# Patient Record
Sex: MALE | Race: WHITE | NOT HISPANIC OR LATINO | Employment: FULL TIME | URBAN - METROPOLITAN AREA
[De-identification: names, ages, dates, MRNs, and addresses within clinical notes are randomized per-mention and may not be internally consistent; named-entity substitution may affect disease eponyms.]

---

## 2021-10-13 ENCOUNTER — OFFICE VISIT (OUTPATIENT)
Dept: OBGYN CLINIC | Facility: MEDICAL CENTER | Age: 51
End: 2021-10-13
Payer: OTHER MISCELLANEOUS

## 2021-10-13 VITALS
BODY MASS INDEX: 33.03 KG/M2 | HEART RATE: 76 BPM | WEIGHT: 223 LBS | SYSTOLIC BLOOD PRESSURE: 150 MMHG | HEIGHT: 69 IN | DIASTOLIC BLOOD PRESSURE: 101 MMHG

## 2021-10-13 DIAGNOSIS — S86.012A STRAIN OF ACHILLES TENDON, LEFT, INITIAL ENCOUNTER: Primary | ICD-10-CM

## 2021-10-13 DIAGNOSIS — S86.112A GASTROCNEMIUS STRAIN, LEFT, INITIAL ENCOUNTER: ICD-10-CM

## 2021-10-13 PROCEDURE — 99204 OFFICE O/P NEW MOD 45 MIN: CPT | Performed by: EMERGENCY MEDICINE

## 2021-10-13 RX ORDER — LORATADINE 10 MG/1
10 TABLET ORAL DAILY
COMMUNITY

## 2021-10-13 RX ORDER — CHLORHEXIDINE GLUCONATE 0.12 MG/ML
RINSE ORAL
COMMUNITY
Start: 2021-09-15

## 2021-10-13 RX ORDER — TERBINAFINE HYDROCHLORIDE 250 MG/1
250 TABLET ORAL DAILY
COMMUNITY
Start: 2021-10-01

## 2021-10-13 RX ORDER — NAPROXEN 500 MG/1
TABLET ORAL AS NEEDED
COMMUNITY
Start: 2021-08-25

## 2021-10-13 RX ORDER — AMLODIPINE BESYLATE 5 MG/1
5 TABLET ORAL
COMMUNITY
Start: 2021-04-19

## 2021-10-13 RX ORDER — ASPIRIN 81 MG/1
TABLET ORAL
COMMUNITY
Start: 2021-08-30

## 2021-10-13 RX ORDER — LEVOTHYROXINE SODIUM 0.15 MG/1
150 TABLET ORAL
COMMUNITY
Start: 2021-09-21 | End: 2022-01-06 | Stop reason: DRUGHIGH

## 2021-10-13 RX ORDER — ATORVASTATIN CALCIUM 40 MG/1
TABLET, FILM COATED ORAL
COMMUNITY
Start: 2021-04-19

## 2021-10-13 RX ORDER — HYDROCHLOROTHIAZIDE 25 MG/1
25 TABLET ORAL DAILY
COMMUNITY
Start: 2021-04-19

## 2021-10-13 RX ORDER — IBUPROFEN 600 MG/1
TABLET ORAL
COMMUNITY
Start: 2021-09-15

## 2021-10-13 RX ORDER — FLUTICASONE PROPIONATE 50 MCG
1-2 SPRAY, SUSPENSION (ML) NASAL AS NEEDED
COMMUNITY
Start: 2021-09-21

## 2021-10-13 RX ORDER — PROPRANOLOL HYDROCHLORIDE 20 MG/1
TABLET ORAL
COMMUNITY
Start: 2021-07-01

## 2021-10-13 RX ORDER — OMEPRAZOLE 20 MG/1
CAPSULE, DELAYED RELEASE ORAL
COMMUNITY
Start: 2021-09-19

## 2021-10-13 RX ORDER — EPINEPHRINE 0.3 MG/.3ML
0.3 INJECTION SUBCUTANEOUS
COMMUNITY

## 2021-10-26 ENCOUNTER — HOSPITAL ENCOUNTER (OUTPATIENT)
Dept: MRI IMAGING | Facility: HOSPITAL | Age: 51
Discharge: HOME | End: 2021-10-26
Payer: OTHER MISCELLANEOUS

## 2021-10-26 DIAGNOSIS — S86.012A STRAIN OF ACHILLES TENDON, LEFT, INITIAL ENCOUNTER: ICD-10-CM

## 2021-10-26 PROCEDURE — G1004 CDSM NDSC: HCPCS

## 2021-10-26 PROCEDURE — 73721 MRI JNT OF LWR EXTRE W/O DYE: CPT

## 2021-11-05 ENCOUNTER — OFFICE VISIT (OUTPATIENT)
Dept: OBGYN CLINIC | Facility: MEDICAL CENTER | Age: 51
End: 2021-11-05
Payer: OTHER MISCELLANEOUS

## 2021-11-05 VITALS
BODY MASS INDEX: 32.88 KG/M2 | WEIGHT: 222 LBS | HEART RATE: 70 BPM | DIASTOLIC BLOOD PRESSURE: 78 MMHG | HEIGHT: 69 IN | TEMPERATURE: 98.3 F | SYSTOLIC BLOOD PRESSURE: 180 MMHG

## 2021-11-05 DIAGNOSIS — S86.012D ACHILLES TENDON TEAR, LEFT, SUBSEQUENT ENCOUNTER: Primary | ICD-10-CM

## 2021-11-05 DIAGNOSIS — M67.88 ACHILLES TENDONOSIS OF LEFT LOWER EXTREMITY: ICD-10-CM

## 2021-11-05 PROCEDURE — 99213 OFFICE O/P EST LOW 20 MIN: CPT | Performed by: EMERGENCY MEDICINE

## 2021-11-08 ENCOUNTER — TELEPHONE (OUTPATIENT)
Dept: PODIATRY | Facility: CLINIC | Age: 51
End: 2021-11-08

## 2021-11-10 ENCOUNTER — OFFICE VISIT (OUTPATIENT)
Dept: PODIATRY | Facility: CLINIC | Age: 51
End: 2021-11-10
Payer: OTHER MISCELLANEOUS

## 2021-11-10 VITALS
BODY MASS INDEX: 32.88 KG/M2 | HEIGHT: 69 IN | HEART RATE: 57 BPM | DIASTOLIC BLOOD PRESSURE: 92 MMHG | SYSTOLIC BLOOD PRESSURE: 148 MMHG | WEIGHT: 222 LBS

## 2021-11-10 DIAGNOSIS — M25.775 OSTEOPHYTE, LEFT FOOT: ICD-10-CM

## 2021-11-10 DIAGNOSIS — S86.012A RUPTURE OF LEFT ACHILLES TENDON, INITIAL ENCOUNTER: Primary | ICD-10-CM

## 2021-11-10 PROCEDURE — 99243 OFF/OP CNSLTJ NEW/EST LOW 30: CPT | Performed by: PODIATRIST

## 2021-11-10 RX ORDER — METFORMIN HYDROCHLORIDE 500 MG/1
500 TABLET, EXTENDED RELEASE ORAL
COMMUNITY
Start: 2021-11-10 | End: 2022-11-10

## 2021-11-10 RX ORDER — LEVALBUTEROL TARTRATE 45 UG/1
AEROSOL, METERED ORAL AS NEEDED
COMMUNITY

## 2021-11-16 ENCOUNTER — PREP FOR PROCEDURE (OUTPATIENT)
Dept: PODIATRY | Facility: CLINIC | Age: 51
End: 2021-11-16

## 2021-11-16 ENCOUNTER — TELEPHONE (OUTPATIENT)
Dept: PODIATRY | Facility: CLINIC | Age: 51
End: 2021-11-16

## 2021-11-16 DIAGNOSIS — Z01.818 PRE-OP TESTING: ICD-10-CM

## 2021-11-16 DIAGNOSIS — S86.012A RUPTURE OF LEFT ACHILLES TENDON, INITIAL ENCOUNTER: Primary | ICD-10-CM

## 2021-11-16 DIAGNOSIS — M25.775 OSTEOPHYTE, LEFT FOOT: ICD-10-CM

## 2021-11-16 RX ORDER — CHLORHEXIDINE GLUCONATE 4 G/100ML
SOLUTION TOPICAL DAILY PRN
Status: CANCELLED | OUTPATIENT
Start: 2021-11-23

## 2021-11-16 RX ORDER — CHLORHEXIDINE GLUCONATE 0.12 MG/ML
15 RINSE ORAL ONCE
Status: CANCELLED | OUTPATIENT
Start: 2021-11-23

## 2021-11-16 RX ORDER — CEFAZOLIN SODIUM 2 G/50ML
2000 SOLUTION INTRAVENOUS ONCE
Status: CANCELLED | OUTPATIENT
Start: 2021-11-23

## 2021-11-22 ENCOUNTER — ANESTHESIA EVENT (OUTPATIENT)
Dept: PERIOP | Facility: HOSPITAL | Age: 51
End: 2021-11-22
Payer: OTHER MISCELLANEOUS

## 2021-11-22 RX ORDER — ACETAMINOPHEN 500 MG
500 TABLET ORAL EVERY 6 HOURS PRN
COMMUNITY

## 2021-11-23 ENCOUNTER — APPOINTMENT (OUTPATIENT)
Dept: RADIOLOGY | Facility: HOSPITAL | Age: 51
End: 2021-11-23
Payer: OTHER MISCELLANEOUS

## 2021-11-23 ENCOUNTER — HOSPITAL ENCOUNTER (OUTPATIENT)
Facility: HOSPITAL | Age: 51
Setting detail: OUTPATIENT SURGERY
Discharge: HOME | End: 2021-11-23
Attending: PODIATRIST | Admitting: PODIATRIST
Payer: OTHER MISCELLANEOUS

## 2021-11-23 ENCOUNTER — HOSPITAL ENCOUNTER (OUTPATIENT)
Dept: RADIOLOGY | Facility: HOSPITAL | Age: 51
Discharge: HOME | End: 2021-11-23
Payer: OTHER MISCELLANEOUS

## 2021-11-23 ENCOUNTER — ANESTHESIA (OUTPATIENT)
Dept: PERIOP | Facility: HOSPITAL | Age: 51
End: 2021-11-23
Payer: OTHER MISCELLANEOUS

## 2021-11-23 VITALS
TEMPERATURE: 97.5 F | HEIGHT: 69 IN | DIASTOLIC BLOOD PRESSURE: 59 MMHG | HEART RATE: 65 BPM | RESPIRATION RATE: 14 BRPM | OXYGEN SATURATION: 96 % | WEIGHT: 251.77 LBS | SYSTOLIC BLOOD PRESSURE: 107 MMHG | BODY MASS INDEX: 37.29 KG/M2

## 2021-11-23 DIAGNOSIS — S86.012A RUPTURE OF LEFT ACHILLES TENDON, INITIAL ENCOUNTER: ICD-10-CM

## 2021-11-23 DIAGNOSIS — Z98.890 POST-OPERATIVE STATE: Primary | ICD-10-CM

## 2021-11-23 DIAGNOSIS — M25.775 OSTEOPHYTE, LEFT FOOT: ICD-10-CM

## 2021-11-23 LAB — GLUCOSE SERPL-MCNC: 100 MG/DL (ref 65–140)

## 2021-11-23 PROCEDURE — V2790 AMNIOTIC MEMBRANE: HCPCS | Performed by: PODIATRIST

## 2021-11-23 PROCEDURE — 88304 TISSUE EXAM BY PATHOLOGIST: CPT | Performed by: PATHOLOGY

## 2021-11-23 PROCEDURE — C1713 ANCHOR/SCREW BN/BN,TIS/BN: HCPCS | Performed by: PODIATRIST

## 2021-11-23 PROCEDURE — 73610 X-RAY EXAM OF ANKLE: CPT

## 2021-11-23 PROCEDURE — 73650 X-RAY EXAM OF HEEL: CPT

## 2021-11-23 PROCEDURE — 27650 REPAIR ACHILLES TENDON: CPT | Performed by: PODIATRIST

## 2021-11-23 PROCEDURE — 28120 PART REMOVAL OF ANKLE/HEEL: CPT | Performed by: PODIATRIST

## 2021-11-23 PROCEDURE — 82948 REAGENT STRIP/BLOOD GLUCOSE: CPT

## 2021-11-23 PROCEDURE — C1781 MESH (IMPLANTABLE): HCPCS | Performed by: PODIATRIST

## 2021-11-23 PROCEDURE — 76000 FLUOROSCOPY <1 HR PHYS/QHP: CPT | Performed by: PODIATRIST

## 2021-11-23 PROCEDURE — NC001 PR NO CHARGE: Performed by: PODIATRIST

## 2021-11-23 DEVICE — KIT SUTURE SPEEDBRIDGE W/ JUMP  CONVENIENCE ACHILLES: Type: IMPLANTABLE DEVICE | Site: ACHILLES TENDON | Status: FUNCTIONAL

## 2021-11-23 DEVICE — IMPLANTABLE DEVICE: Type: IMPLANTABLE DEVICE | Site: ACHILLES TENDON | Status: FUNCTIONAL

## 2021-11-23 RX ORDER — DEXAMETHASONE SODIUM PHOSPHATE 4 MG/ML
INJECTION, SOLUTION INTRA-ARTICULAR; INTRALESIONAL; INTRAMUSCULAR; INTRAVENOUS; SOFT TISSUE AS NEEDED
Status: DISCONTINUED | OUTPATIENT
Start: 2021-11-23 | End: 2021-11-23

## 2021-11-23 RX ORDER — CEFAZOLIN SODIUM 2 G/50ML
SOLUTION INTRAVENOUS AS NEEDED
Status: DISCONTINUED | OUTPATIENT
Start: 2021-11-23 | End: 2021-11-23

## 2021-11-23 RX ORDER — ONDANSETRON 2 MG/ML
4 INJECTION INTRAMUSCULAR; INTRAVENOUS EVERY 6 HOURS PRN
Status: DISCONTINUED | OUTPATIENT
Start: 2021-11-23 | End: 2021-11-23 | Stop reason: HOSPADM

## 2021-11-23 RX ORDER — LIDOCAINE HYDROCHLORIDE 20 MG/ML
INJECTION, SOLUTION EPIDURAL; INFILTRATION; INTRACAUDAL; PERINEURAL AS NEEDED
Status: DISCONTINUED | OUTPATIENT
Start: 2021-11-23 | End: 2021-11-23

## 2021-11-23 RX ORDER — ROCURONIUM BROMIDE 10 MG/ML
INJECTION, SOLUTION INTRAVENOUS AS NEEDED
Status: DISCONTINUED | OUTPATIENT
Start: 2021-11-23 | End: 2021-11-23

## 2021-11-23 RX ORDER — CHLORHEXIDINE GLUCONATE 0.12 MG/ML
15 RINSE ORAL ONCE
Status: COMPLETED | OUTPATIENT
Start: 2021-11-23 | End: 2021-11-23

## 2021-11-23 RX ORDER — PROPOFOL 10 MG/ML
INJECTION, EMULSION INTRAVENOUS AS NEEDED
Status: DISCONTINUED | OUTPATIENT
Start: 2021-11-23 | End: 2021-11-23

## 2021-11-23 RX ORDER — MAGNESIUM HYDROXIDE 1200 MG/15ML
LIQUID ORAL AS NEEDED
Status: DISCONTINUED | OUTPATIENT
Start: 2021-11-23 | End: 2021-11-23 | Stop reason: HOSPADM

## 2021-11-23 RX ORDER — CEFAZOLIN SODIUM 2 G/50ML
2000 SOLUTION INTRAVENOUS ONCE
Status: DISCONTINUED | OUTPATIENT
Start: 2021-11-23 | End: 2021-11-23 | Stop reason: HOSPADM

## 2021-11-23 RX ORDER — SODIUM CHLORIDE 9 MG/ML
125 INJECTION, SOLUTION INTRAVENOUS CONTINUOUS
Status: DISCONTINUED | OUTPATIENT
Start: 2021-11-23 | End: 2021-11-23 | Stop reason: HOSPADM

## 2021-11-23 RX ORDER — GLYCOPYRROLATE 0.2 MG/ML
INJECTION INTRAMUSCULAR; INTRAVENOUS AS NEEDED
Status: DISCONTINUED | OUTPATIENT
Start: 2021-11-23 | End: 2021-11-23

## 2021-11-23 RX ORDER — ONDANSETRON 2 MG/ML
INJECTION INTRAMUSCULAR; INTRAVENOUS AS NEEDED
Status: DISCONTINUED | OUTPATIENT
Start: 2021-11-23 | End: 2021-11-23

## 2021-11-23 RX ORDER — NEOSTIGMINE METHYLSULFATE 1 MG/ML
INJECTION INTRAVENOUS AS NEEDED
Status: DISCONTINUED | OUTPATIENT
Start: 2021-11-23 | End: 2021-11-23

## 2021-11-23 RX ORDER — CHLORHEXIDINE GLUCONATE 4 G/100ML
SOLUTION TOPICAL DAILY PRN
Status: DISCONTINUED | OUTPATIENT
Start: 2021-11-23 | End: 2021-11-23 | Stop reason: HOSPADM

## 2021-11-23 RX ORDER — ROPIVACAINE HYDROCHLORIDE 5 MG/ML
INJECTION, SOLUTION EPIDURAL; INFILTRATION; PERINEURAL
Status: COMPLETED | OUTPATIENT
Start: 2021-11-23 | End: 2021-11-23

## 2021-11-23 RX ORDER — MIDAZOLAM HYDROCHLORIDE 2 MG/2ML
INJECTION, SOLUTION INTRAMUSCULAR; INTRAVENOUS AS NEEDED
Status: DISCONTINUED | OUTPATIENT
Start: 2021-11-23 | End: 2021-11-23

## 2021-11-23 RX ORDER — OXYCODONE HYDROCHLORIDE 5 MG/1
10 TABLET ORAL EVERY 4 HOURS PRN
Status: DISCONTINUED | OUTPATIENT
Start: 2021-11-23 | End: 2021-11-23 | Stop reason: HOSPADM

## 2021-11-23 RX ORDER — OXYCODONE HYDROCHLORIDE AND ACETAMINOPHEN 5; 325 MG/1; MG/1
1 TABLET ORAL EVERY 6 HOURS PRN
Qty: 20 TABLET | Refills: 0 | Status: SHIPPED | OUTPATIENT
Start: 2021-11-23

## 2021-11-23 RX ORDER — FENTANYL CITRATE 50 UG/ML
INJECTION, SOLUTION INTRAMUSCULAR; INTRAVENOUS AS NEEDED
Status: DISCONTINUED | OUTPATIENT
Start: 2021-11-23 | End: 2021-11-23

## 2021-11-23 RX ORDER — FENTANYL CITRATE 50 UG/ML
50 INJECTION, SOLUTION INTRAMUSCULAR; INTRAVENOUS
Status: DISCONTINUED | OUTPATIENT
Start: 2021-11-23 | End: 2021-11-23 | Stop reason: HOSPADM

## 2021-11-23 RX ORDER — ACETAMINOPHEN 325 MG/1
650 TABLET ORAL EVERY 6 HOURS PRN
Status: DISCONTINUED | OUTPATIENT
Start: 2021-11-23 | End: 2021-11-23 | Stop reason: HOSPADM

## 2021-11-23 RX ADMIN — ROCURONIUM BROMIDE 50 MG: 50 INJECTION, SOLUTION INTRAVENOUS at 11:12

## 2021-11-23 RX ADMIN — GLYCOPYRROLATE 1 MG: 0.2 INJECTION, SOLUTION INTRAMUSCULAR; INTRAVENOUS at 13:13

## 2021-11-23 RX ADMIN — PROPOFOL 200 MG: 10 INJECTION, EMULSION INTRAVENOUS at 11:12

## 2021-11-23 RX ADMIN — CHLORHEXIDINE GLUCONATE 0.12% ORAL RINSE 15 ML: 1.2 LIQUID ORAL at 10:05

## 2021-11-23 RX ADMIN — NEOSTIGMINE METHYLSULFATE 5 MG: 1 INJECTION INTRAVENOUS at 13:13

## 2021-11-23 RX ADMIN — ROPIVACAINE HYDROCHLORIDE 30 ML: 5 INJECTION, SOLUTION EPIDURAL; INFILTRATION; PERINEURAL at 10:52

## 2021-11-23 RX ADMIN — LIDOCAINE HYDROCHLORIDE 100 MG: 20 INJECTION, SOLUTION EPIDURAL; INFILTRATION; INTRACAUDAL; PERINEURAL at 11:12

## 2021-11-23 RX ADMIN — DEXAMETHASONE SODIUM PHOSPHATE 4 MG: 4 INJECTION INTRA-ARTICULAR; INTRALESIONAL; INTRAMUSCULAR; INTRAVENOUS; SOFT TISSUE at 11:19

## 2021-11-23 RX ADMIN — ONDANSETRON 4 MG: 2 INJECTION INTRAMUSCULAR; INTRAVENOUS at 12:57

## 2021-11-23 RX ADMIN — MIDAZOLAM 4 MG: 1 INJECTION INTRAMUSCULAR; INTRAVENOUS at 10:49

## 2021-11-23 RX ADMIN — SODIUM CHLORIDE 125 ML/HR: 0.9 INJECTION, SOLUTION INTRAVENOUS at 10:27

## 2021-11-23 RX ADMIN — SODIUM CHLORIDE 125 ML/HR: 0.9 INJECTION, SOLUTION INTRAVENOUS at 13:50

## 2021-11-23 RX ADMIN — FENTANYL CITRATE 100 MCG: 50 INJECTION INTRAMUSCULAR; INTRAVENOUS at 10:49

## 2021-11-23 RX ADMIN — CEFAZOLIN SODIUM 2000 MG: 2 SOLUTION INTRAVENOUS at 11:00

## 2021-11-24 ENCOUNTER — NURSE TRIAGE (OUTPATIENT)
Dept: OTHER | Facility: OTHER | Age: 51
End: 2021-11-24

## 2021-11-26 ENCOUNTER — HOSPITAL ENCOUNTER (EMERGENCY)
Facility: HOSPITAL | Age: 51
Discharge: HOME | End: 2021-11-26
Attending: EMERGENCY MEDICINE | Admitting: EMERGENCY MEDICINE
Payer: OTHER MISCELLANEOUS

## 2021-11-26 VITALS
OXYGEN SATURATION: 99 % | SYSTOLIC BLOOD PRESSURE: 148 MMHG | RESPIRATION RATE: 16 BRPM | TEMPERATURE: 97.7 F | HEART RATE: 68 BPM | DIASTOLIC BLOOD PRESSURE: 77 MMHG

## 2021-11-26 DIAGNOSIS — M79.672 PAIN OF LEFT HEEL: Primary | ICD-10-CM

## 2021-11-26 PROCEDURE — 99283 EMERGENCY DEPT VISIT LOW MDM: CPT

## 2021-11-26 PROCEDURE — 29515 APPLICATION SHORT LEG SPLINT: CPT | Performed by: PHYSICIAN ASSISTANT

## 2021-11-26 PROCEDURE — 99284 EMERGENCY DEPT VISIT MOD MDM: CPT | Performed by: PHYSICIAN ASSISTANT

## 2021-11-30 ENCOUNTER — OFFICE VISIT (OUTPATIENT)
Dept: PODIATRY | Facility: CLINIC | Age: 51
End: 2021-11-30
Payer: OTHER MISCELLANEOUS

## 2021-11-30 VITALS
DIASTOLIC BLOOD PRESSURE: 95 MMHG | HEIGHT: 69 IN | HEART RATE: 68 BPM | SYSTOLIC BLOOD PRESSURE: 147 MMHG | BODY MASS INDEX: 37.18 KG/M2 | WEIGHT: 251 LBS

## 2021-11-30 DIAGNOSIS — M25.775 OSTEOPHYTE, LEFT FOOT: ICD-10-CM

## 2021-11-30 DIAGNOSIS — S86.012A RUPTURE OF LEFT ACHILLES TENDON, INITIAL ENCOUNTER: Primary | ICD-10-CM

## 2021-11-30 PROCEDURE — 29515 APPLICATION SHORT LEG SPLINT: CPT | Performed by: PODIATRIST

## 2021-12-07 ENCOUNTER — OFFICE VISIT (OUTPATIENT)
Dept: PODIATRY | Facility: CLINIC | Age: 51
End: 2021-12-07

## 2021-12-07 VITALS
WEIGHT: 251 LBS | DIASTOLIC BLOOD PRESSURE: 76 MMHG | SYSTOLIC BLOOD PRESSURE: 119 MMHG | BODY MASS INDEX: 37.18 KG/M2 | HEIGHT: 69 IN | HEART RATE: 62 BPM

## 2021-12-07 DIAGNOSIS — S86.012A RUPTURE OF LEFT ACHILLES TENDON, INITIAL ENCOUNTER: Primary | ICD-10-CM

## 2021-12-07 DIAGNOSIS — M25.775 OSTEOPHYTE, LEFT FOOT: ICD-10-CM

## 2021-12-07 PROCEDURE — 99024 POSTOP FOLLOW-UP VISIT: CPT | Performed by: PODIATRIST

## 2022-01-06 ENCOUNTER — OFFICE VISIT (OUTPATIENT)
Dept: PODIATRY | Facility: CLINIC | Age: 52
End: 2022-01-06

## 2022-01-06 VITALS
SYSTOLIC BLOOD PRESSURE: 123 MMHG | HEART RATE: 60 BPM | WEIGHT: 250 LBS | BODY MASS INDEX: 37.03 KG/M2 | DIASTOLIC BLOOD PRESSURE: 77 MMHG | HEIGHT: 69 IN

## 2022-01-06 DIAGNOSIS — S86.012A RUPTURE OF LEFT ACHILLES TENDON, INITIAL ENCOUNTER: Primary | ICD-10-CM

## 2022-01-06 DIAGNOSIS — Z98.890 POST-OPERATIVE STATE: ICD-10-CM

## 2022-01-06 DIAGNOSIS — M25.775 OSTEOPHYTE, LEFT FOOT: ICD-10-CM

## 2022-01-06 PROCEDURE — 99024 POSTOP FOLLOW-UP VISIT: CPT | Performed by: PODIATRIST

## 2022-01-06 RX ORDER — LEVOTHYROXINE SODIUM 137 UG/1
TABLET ORAL
COMMUNITY
Start: 2021-12-28

## 2022-01-06 NOTE — PROGRESS NOTES
PATIENT:  Wilson Pedro      1970      ASSESSMENT     1  Rupture of left Achilles tendon, initial encounter  Ambulatory referral to Physical Therapy    Diclofenac Sodium (VOLTAREN) 1 %   2  Osteophyte, left foot  Ambulatory referral to Physical Therapy    Diclofenac Sodium (VOLTAREN) 1 %   3  Post-operative state  Ambulatory referral to Physical Therapy          PLAN  Patient is doing well post-operatively  Referred him to PT/OT  Okay to put full weight with CAM walker  Limit his walking  Not allowed to return to work  Voltaren gel for sensitivity in incision  Silicone foam pad for protection  Continue post-op care as instructed  Call if any increase in pain, fevers, calf pain, shortness of breath, or general distress is noted  Patient instructed to go to ER if call is not returned immediately  He is not allowed to return to work at this time  Will re-evaluate in 4 weeks  HISTORY OF PRESENT ILLNESS  Patient presents for post-op appointment  Post-op pain is resolving well  The patient is feeling well and in good spirits  He feels some pain and sensitivity around the incision especially when he wears boot  It seems to rub on it  REVIEW OF SYSTEMS  GENERAL: No fever or chills  HEART: No chest pain, or palpitation  RESPIRATORY:  No SOB or cough  GI: No Nausea, vomit or diarrhea  NEUROLOGIC: No syncope or acute weakness  MUSCULOSKELETAL: No calf pain or edema  PHYSICAL EXAMINATION  GENERAL  The patient appears in NAD / non-toxic  Afebrile  VSS    VASCULAR EXAM  Pedal pulses and vascular status are intact  No calf pain or edema bilaterally  No cyanosis  DERMATOLOGIC EXAM  No wound  No signs of infection  No drainage  Decreased post-op edema  No necrosis or dehiscence  NEUROLOGIC EXAM  AAO X 3  No focal neurologic deficit  Neurologic status is intact BLE  MUSCULOSKELETAL EXAM  Good surgical correction  Normal post-op findings  Good left ankle ROM    No fluctuation or crepitus

## 2022-01-13 ENCOUNTER — EVALUATION (OUTPATIENT)
Dept: PHYSICAL THERAPY | Facility: REHABILITATION | Age: 52
End: 2022-01-13
Payer: OTHER MISCELLANEOUS

## 2022-01-13 DIAGNOSIS — Z98.890 POST-OPERATIVE STATE: ICD-10-CM

## 2022-01-13 DIAGNOSIS — M25.775 OSTEOPHYTE, LEFT FOOT: ICD-10-CM

## 2022-01-13 DIAGNOSIS — S86.012D RUPTURE OF LEFT ACHILLES TENDON, SUBSEQUENT ENCOUNTER: Primary | ICD-10-CM

## 2022-01-13 PROCEDURE — 97110 THERAPEUTIC EXERCISES: CPT | Performed by: PHYSICAL THERAPIST

## 2022-01-13 PROCEDURE — 97161 PT EVAL LOW COMPLEX 20 MIN: CPT | Performed by: PHYSICAL THERAPIST

## 2022-01-13 NOTE — PROGRESS NOTES
PT Evaluation     Today's date: 2022  Patient name: Nusrat Epps  : 1970  MRN: [id number]  Referring provider: Radha Crawford DPM  Dx:   Encounter Diagnosis     ICD-10-CM    1  Rupture of left Achilles tendon, subsequent encounter  S86 012D Ambulatory referral to Physical Therapy   2  Osteophyte, left foot  M25 775 Ambulatory referral to Physical Therapy   3  Post-operative state  Z98 890 Ambulatory referral to Physical Therapy       Start Time: 1033  Stop Time: 1118  Total time in clinic (min): 45 minutes    Assessment  Assessment details: Patient is a 46 y o  male presenting s/p L Achilles repair and bone graft with date of surgery 21  Resulting postoperative impairments include decreased ankle mobility/strength, gait dysfunction, and generalized L LE strength deficits  As a result of impairments patient experiences limitations with functional/daily activities including inability to return to work, inability to ambulate recreationally, stair navigation, and mobility/transfers  Precautions, surgical protocol, and signs/symptoms of infection were reviewed with patient who expressed verbal understanding  Patient has the above listed impairments and will benefit from skilled PT to improve deficits to return to prior level of function  Impairments: abnormal muscle firing, abnormal or restricted ROM, abnormal movement, activity intolerance, impaired physical strength and pain with function     Prognosis: good    Goals  Impairment Goals: 4-6 weeks  - Patient to decrease pain to 2/10  - Patient to improve ankle AROM [facility]  - Patient to improve L hip/knee strength to 4+/5 throughout    Functional Goals: by discharge  - Patient to discharge to independent [facility]  - Patient to increase FOTO to 64  - Patient to return to work without limitations  - Patient to improve L ankle strength to 4+/5  - Patient to lift 70 lbs   floor to waist without compensation/limitations    Plan  Patient would benefit from: skilled physical therapy  Planned modality interventions: cryotherapy, TENS and thermotherapy: hydrocollator packs  Planned therapy interventions: flexibility, home exercise program, joint mobilization, manual therapy, neuromuscular re-education, patient education, strengthening, stretching, therapeutic activities, therapeutic exercise and functional ROM exercises  Frequency: 2x week  Duration in weeks: 12  Treatment plan discussed with: patient        Subjective Evaluation    History of Present Illness  Mechanism of injury: HISTORY OF PRESENT ILLNESS: Patient injured his L ankle at work 21  He was trying to open a stuck door and kicked the door with his L foot  He felt sudden onset of pain in the calf  He worked for 2 weeks and the pain continued  He was evaluated and underwent x-rays and MRI which revealed Achilles partial tear with avulsion from distal attachment  Patient is s/p L Achilles repair with bone graft 21  Patient was NWB for approximately 4 weeks and he is now ambulating in CAM boot WBAT  Patient has next postoperative follow-up in February    PRIOR TREATMENT: surgery as detailed above  AGGRAVATING FACTORS: WB, boot   EASING FACTORS: NWB, rest  WORK:  (description in chart)  IMAGING: x-rays and MRI preoperatively  FUNCTIONAL LIMITATIONS: unable to work, WBAT in CAM boot, unable to walk recreationally, stair navigation, decreased aerobic capacity  SUBJECTIVE FUNCTIONAL LEVEL: 40%  PATIENT GOAL: just to heal and get back to work    Pain  Current pain rating: 3  At best pain ratin  At worst pain ratin  Location: L Achilles          Objective     Palpation     Additional Palpation Details  Incision observed and well healing with no redness, warmth or other signs of infection    Neurological Testing     Sensation     Lumbar   Left   Intact: light touch    Right   Intact: light touch    Active Range of Motion   Left Ankle/Foot   Dorsiflexion (ke): 8 degrees   Plantar flexion: 40 degrees   Inversion: 20 degrees   Eversion: 15 degrees     Right Ankle/Foot   Dorsiflexion (ke): 10 degrees   Plantar flexion: WFL  Inversion: WFL  Eversion: WFL    Strength/Myotome Testing     Lumbar     Right   Normal strength    Left Hip   Planes of Motion   Flexion: 5  External rotation: 4  Internal rotation: 4    Left Knee   Flexion: 4  Extension: 4    Left Ankle/Foot   Dorsiflexion: 3-  Plantar flexion: 3-  Inversion: 3-  Eversion: 3-    Right Ankle/Foot   Normal strength    Ambulation     Observational Gait     Additional Observational Gait Details  WBAT in CAM boot             Diagnosis: s/p L Achilles repair and bone graft 11/23/21   Precautions: avoid Achilles stretching, WBAT in CAM boot   Primary impairments: decreased ankle mobility/strength, gait dysfunction, and generalized L LE strength deficits   *asterisks by exercise = given for HEP    1/13       Manuals        Posterior talocrural mobilizations        Scar mobilization                                There Ex        Rec bike        Ankle DF/PF AROM *  x 15       Ankle inv/ev AROM *  x 15       Ankle circles *  x 10       Ankle alphabet          Seated BAPS: DF/PF, inv/ev and circles                                Neuro Re-Ed        4-way ankle band        Ankle isometrics        Supine SLR        S/L hip abd        Prone hip ext                                                                        Re-evaluation             Ther Act/Gait                                         Modalities             CP prn

## 2022-01-19 ENCOUNTER — OFFICE VISIT (OUTPATIENT)
Dept: PHYSICAL THERAPY | Facility: REHABILITATION | Age: 52
End: 2022-01-19
Payer: OTHER MISCELLANEOUS

## 2022-01-19 DIAGNOSIS — M25.775 OSTEOPHYTE, LEFT FOOT: ICD-10-CM

## 2022-01-19 DIAGNOSIS — Z98.890 POST-OPERATIVE STATE: ICD-10-CM

## 2022-01-19 DIAGNOSIS — S86.012D RUPTURE OF LEFT ACHILLES TENDON, SUBSEQUENT ENCOUNTER: Primary | ICD-10-CM

## 2022-01-19 PROCEDURE — 97112 NEUROMUSCULAR REEDUCATION: CPT | Performed by: PHYSICAL THERAPIST

## 2022-01-19 PROCEDURE — 97140 MANUAL THERAPY 1/> REGIONS: CPT | Performed by: PHYSICAL THERAPIST

## 2022-01-19 PROCEDURE — 97110 THERAPEUTIC EXERCISES: CPT | Performed by: PHYSICAL THERAPIST

## 2022-01-19 NOTE — PROGRESS NOTES
Daily Note     Today's date: 2022  Patient name: Thomas Gao  : 1970  MRN: [id number]  Referring provider: Violet Mccabe DPM  Dx:   Encounter Diagnosis     ICD-10-CM    1  Rupture of left Achilles tendon, subsequent encounter  S86 012D    2  Osteophyte, left foot  M25 775    3  Post-operative state  Z98 890        Start Time: 1430  Stop Time: 1516  Total time in clinic (min): 46 minutes    Subjective: Patient reports no adverse effects following initial evaluation  He continues to note pain at bottom of his incision, which is aggravated with increased walking and is also miscolored and painful to palpation  Patient plans on contacting surgeon regarding this  Objective: See treatment diary below      Assessment: Patient tolerated first treatment well with no aggravation of sx reported following today's session  Scar mobilizations were performed superior to tender spot  Cueing provided during ankle ROM exercises to avoid excessive lower leg rotation  Progress, as able  Plan: Continue per plan of care        Diagnosis: s/p L Achilles repair and bone graft 21   Precautions: avoid Achilles stretching, WBAT in CAM boot   Primary impairments: decreased ankle mobility/strength, gait dysfunction, and generalized L LE strength deficits   *asterisks by exercise = given for HEP          Manuals        Posterior talocrural mobilizations  JAB also mid/forefoot and great toe mobility      Scar mobilization  JAB                              There Ex        Rec bike        Ankle DF/PF AROM *  x 15 15x ea      Ankle inv/ev AROM *  x 15 15x ea      Ankle circles *  x 10 15x ea      Ankle alphabet    2x       Seated BAPS: DF/PF, inv/ev and circles  No circles   20x ea                              Neuro Re-Ed        4-way ankle band        Ankle isometrics        Supine SLR*  2x10x5"       S/L hip abd*  2x10x5"       Prone hip ext*  2x10x5" Re-evaluation             Ther Act/Gait                                         Modalities             CP prn                        Corene Stage

## 2022-01-20 ENCOUNTER — TELEPHONE (OUTPATIENT)
Dept: PODIATRY | Facility: CLINIC | Age: 52
End: 2022-01-20

## 2022-01-20 ENCOUNTER — OFFICE VISIT (OUTPATIENT)
Dept: PODIATRY | Facility: CLINIC | Age: 52
End: 2022-01-20

## 2022-01-20 VITALS
DIASTOLIC BLOOD PRESSURE: 80 MMHG | WEIGHT: 250 LBS | HEIGHT: 69 IN | HEART RATE: 63 BPM | SYSTOLIC BLOOD PRESSURE: 123 MMHG | BODY MASS INDEX: 37.03 KG/M2

## 2022-01-20 DIAGNOSIS — M25.775 OSTEOPHYTE, LEFT FOOT: ICD-10-CM

## 2022-01-20 DIAGNOSIS — S86.012A RUPTURE OF LEFT ACHILLES TENDON, INITIAL ENCOUNTER: Primary | ICD-10-CM

## 2022-01-20 PROCEDURE — 99024 POSTOP FOLLOW-UP VISIT: CPT | Performed by: PODIATRIST
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
## 2022-01-21 ENCOUNTER — OFFICE VISIT (OUTPATIENT)
Dept: PHYSICAL THERAPY | Facility: REHABILITATION | Age: 52
End: 2022-01-21
Payer: OTHER MISCELLANEOUS

## 2022-01-21 DIAGNOSIS — S86.012D RUPTURE OF LEFT ACHILLES TENDON, SUBSEQUENT ENCOUNTER: Primary | ICD-10-CM

## 2022-01-21 DIAGNOSIS — Z98.890 POST-OPERATIVE STATE: ICD-10-CM

## 2022-01-21 DIAGNOSIS — M25.775 OSTEOPHYTE, LEFT FOOT: ICD-10-CM

## 2022-01-21 PROCEDURE — 97110 THERAPEUTIC EXERCISES: CPT | Performed by: PHYSICAL THERAPIST

## 2022-01-21 PROCEDURE — 97112 NEUROMUSCULAR REEDUCATION: CPT | Performed by: PHYSICAL THERAPIST

## 2022-01-21 PROCEDURE — 97140 MANUAL THERAPY 1/> REGIONS: CPT | Performed by: PHYSICAL THERAPIST
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
## 2022-01-26 ENCOUNTER — OFFICE VISIT (OUTPATIENT)
Dept: PHYSICAL THERAPY | Facility: REHABILITATION | Age: 52
End: 2022-01-26
Payer: OTHER MISCELLANEOUS

## 2022-01-26 DIAGNOSIS — M25.775 OSTEOPHYTE, LEFT FOOT: ICD-10-CM

## 2022-01-26 DIAGNOSIS — Z98.890 POST-OPERATIVE STATE: ICD-10-CM

## 2022-01-26 DIAGNOSIS — S86.012D RUPTURE OF LEFT ACHILLES TENDON, SUBSEQUENT ENCOUNTER: Primary | ICD-10-CM

## 2022-01-26 PROCEDURE — 97110 THERAPEUTIC EXERCISES: CPT | Performed by: PHYSICAL THERAPIST

## 2022-01-26 PROCEDURE — 97112 NEUROMUSCULAR REEDUCATION: CPT | Performed by: PHYSICAL THERAPIST

## 2022-01-26 PROCEDURE — 97140 MANUAL THERAPY 1/> REGIONS: CPT | Performed by: PHYSICAL THERAPIST
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
## 2022-01-28 ENCOUNTER — OFFICE VISIT (OUTPATIENT)
Dept: PHYSICAL THERAPY | Facility: REHABILITATION | Age: 52
End: 2022-01-28
Payer: OTHER MISCELLANEOUS

## 2022-01-28 DIAGNOSIS — M25.775 OSTEOPHYTE, LEFT FOOT: ICD-10-CM

## 2022-01-28 DIAGNOSIS — S86.012D RUPTURE OF LEFT ACHILLES TENDON, SUBSEQUENT ENCOUNTER: Primary | ICD-10-CM

## 2022-01-28 DIAGNOSIS — Z98.890 POST-OPERATIVE STATE: ICD-10-CM

## 2022-01-28 PROCEDURE — 97112 NEUROMUSCULAR REEDUCATION: CPT | Performed by: PHYSICAL THERAPIST

## 2022-01-28 PROCEDURE — 97140 MANUAL THERAPY 1/> REGIONS: CPT | Performed by: PHYSICAL THERAPIST

## 2022-01-28 PROCEDURE — 97110 THERAPEUTIC EXERCISES: CPT | Performed by: PHYSICAL THERAPIST
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
## 2022-02-02 ENCOUNTER — OFFICE VISIT (OUTPATIENT)
Dept: PHYSICAL THERAPY | Facility: REHABILITATION | Age: 52
End: 2022-02-02
Payer: OTHER MISCELLANEOUS

## 2022-02-02 DIAGNOSIS — Z98.890 POST-OPERATIVE STATE: ICD-10-CM

## 2022-02-02 DIAGNOSIS — S86.012D RUPTURE OF LEFT ACHILLES TENDON, SUBSEQUENT ENCOUNTER: Primary | ICD-10-CM

## 2022-02-02 DIAGNOSIS — M25.775 OSTEOPHYTE, LEFT FOOT: ICD-10-CM

## 2022-02-02 PROCEDURE — 97140 MANUAL THERAPY 1/> REGIONS: CPT | Performed by: PHYSICAL THERAPIST

## 2022-02-02 PROCEDURE — 97110 THERAPEUTIC EXERCISES: CPT | Performed by: PHYSICAL THERAPIST

## 2022-02-02 PROCEDURE — 97112 NEUROMUSCULAR REEDUCATION: CPT | Performed by: PHYSICAL THERAPIST
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
## 2022-02-03 ENCOUNTER — OFFICE VISIT (OUTPATIENT)
Dept: PODIATRY | Facility: CLINIC | Age: 52
End: 2022-02-03

## 2022-02-03 VITALS
HEART RATE: 59 BPM | HEIGHT: 69 IN | SYSTOLIC BLOOD PRESSURE: 146 MMHG | BODY MASS INDEX: 36.92 KG/M2 | DIASTOLIC BLOOD PRESSURE: 85 MMHG

## 2022-02-03 DIAGNOSIS — M25.775 OSTEOPHYTE, LEFT FOOT: ICD-10-CM

## 2022-02-03 DIAGNOSIS — S86.012A RUPTURE OF LEFT ACHILLES TENDON, INITIAL ENCOUNTER: Primary | ICD-10-CM

## 2022-02-03 DIAGNOSIS — Z98.890 POST-OPERATIVE STATE: ICD-10-CM

## 2022-02-03 PROCEDURE — 99024 POSTOP FOLLOW-UP VISIT: CPT | Performed by: PODIATRIST

## 2022-02-03 NOTE — PROGRESS NOTES
PATIENT:  Mabel Cárdenas      1970      ASSESSMENT     1  Rupture of left Achilles tendon, initial encounter     2  Osteophyte, left foot     3  Post-operative state            PLAN  He is doing well  Continue PT/OT  Okay to initiate strengthening program at PT  Still no work  Okay to try regular shoes with ankle support for about an hour a day  HISTORY OF PRESENT ILLNESS  Patient presents for follow-up  He is doing well with decreased pain and swelling  Tolerates WB well with CAM walker  Doing well at PT  REVIEW OF SYSTEMS  GENERAL: No fever or chills  HEART: No chest pain, or palpitation  RESPIRATORY:  No SOB or cough  GI: No Nausea, vomit or diarrhea  NEUROLOGIC: No syncope or acute weakness  MUSCULOSKELETAL: No calf pain or edema  PHYSICAL EXAMINATION  GENERAL  The patient appears in NAD / non-toxic  Afebrile  VSS    VASCULAR EXAM  Pedal pulses and vascular status are intact  No calf pain or edema bilaterally  No cyanosis  DERMATOLOGIC EXAM  Eschar resolved around distal incision  No wound  No signs of infection  No drainage  No necrosis or dehiscence  Edema continues to resolve  NEUROLOGIC EXAM  AAO X 3  No focal neurologic deficit  Neurologic status is intact BLE  MUSCULOSKELETAL EXAM  Good surgical correction  Achilles tendon intact left  Negative Mcneil sign  Normal post-op findings  Improved left ankle ROM  No fluctuation or crepitus

## 2022-02-04 ENCOUNTER — OFFICE VISIT (OUTPATIENT)
Dept: PHYSICAL THERAPY | Facility: REHABILITATION | Age: 52
End: 2022-02-04
Payer: OTHER MISCELLANEOUS

## 2022-02-04 DIAGNOSIS — S86.012D RUPTURE OF LEFT ACHILLES TENDON, SUBSEQUENT ENCOUNTER: Primary | ICD-10-CM

## 2022-02-04 DIAGNOSIS — Z98.890 POST-OPERATIVE STATE: ICD-10-CM

## 2022-02-04 DIAGNOSIS — M25.775 OSTEOPHYTE, LEFT FOOT: ICD-10-CM

## 2022-02-04 PROCEDURE — 97110 THERAPEUTIC EXERCISES: CPT | Performed by: PHYSICAL THERAPIST

## 2022-02-04 PROCEDURE — 97112 NEUROMUSCULAR REEDUCATION: CPT | Performed by: PHYSICAL THERAPIST

## 2022-02-04 PROCEDURE — 97140 MANUAL THERAPY 1/> REGIONS: CPT | Performed by: PHYSICAL THERAPIST

## 2022-02-04 NOTE — PROGRESS NOTES
Daily Note     Today's date: 2022  Patient name: Jennyfer Found  : 1970  MRN: [id number]  Referring provider: Adama Ramirez DPM  Dx:   Encounter Diagnosis     ICD-10-CM    1  Rupture of left Achilles tendon, subsequent encounter  S86 012D    2  Osteophyte, left foot  M25 775    3  Post-operative state  Z98 890        Start Time: 1123  Stop Time: 1200  Total time in clinic (min): 37 minutes    Subjective: Treatment time slightly limited due to patient arrived a few minutes late  Patient followed-up with surgeon and is to wear shoe 1 hour/day with ankle brace  He has received documentation from insurance suggesting possible return to sedentary duty      Objective: See treatment diary below      Assessment: Tolerated treatment well  Discussed return to work status with patient given insurance documentation regarding sedentary duty  Educated patient regarding importance of respecting healing process and inability for patient to complete any work in standing position as patient has not yet transitioned out of CAM boot and is unable to tolerate prolonged durations of standing  Instructed patient that any official restrictions for work must come from surgeon  Initiated band resisted ankle PF with good tolerance  Patient exhibited good technique with therapeutic exercises and would benefit from continued PT  Plan: Continue per plan of care  Diagnosis: s/p L Achilles repair and bone graft 21   Precautions: avoid Achilles stretching, WBAT in CAM boot   Primary impairments: decreased ankle mobility/strength, gait dysfunction, and generalized L LE strength deficits   *asterisks by exercise = given for HEP    2/2 2/   Manuals        Posterior talocrural mobilizations  8'  8'   KK also mid/forefoot and great toe mobility  8'  8'   Scar mobilization   np                             There Ex        Rec bike  L3 x 8'  L2 x 5'   L1 x 8'    Ankle DF/PF AROM *   15x ea    HEP    Ankle inv/ev AROM *   15x ea  HEP    Ankle circles *  HEP  15x ea  x 20 cw/ccw    Ankle alphabet *  HEP  2x  x 1    Seated BAPS: DF/PF, inv/ev and circles  L3 x 20 each  No circles   20x ea  L3 x 20 each  L3 x 20 except circles                   Patient education   plan of care/work      Neuro Re-Ed        3-way ankle band  red x 10 each  red x 15 each      Band ankle PF   red x 10      Ankle isometrics  5" x 15 each    5" x 10 each  5" x 10 each   Supine SLR *   2x10x5"   1 lb 2 x 10  HEP   S/L hip abd *   2x10x5"   1 lb 2 x 10  HEP   Prone hip ext *   2x10x5"   1 lb 2 x 10  HEP   Standing 3-way SLR  NV     x 10 each B                                                           Re-evaluation           Ther Act/Gait                                  Modalities           CP prn

## 2022-02-09 ENCOUNTER — OFFICE VISIT (OUTPATIENT)
Dept: PHYSICAL THERAPY | Facility: REHABILITATION | Age: 52
End: 2022-02-09
Payer: OTHER MISCELLANEOUS

## 2022-02-09 ENCOUNTER — TELEPHONE (OUTPATIENT)
Dept: PODIATRY | Facility: CLINIC | Age: 52
End: 2022-02-09

## 2022-02-09 ENCOUNTER — TELEPHONE (OUTPATIENT)
Dept: OBGYN CLINIC | Facility: HOSPITAL | Age: 52
End: 2022-02-09

## 2022-02-09 DIAGNOSIS — Z98.890 POST-OPERATIVE STATE: ICD-10-CM

## 2022-02-09 DIAGNOSIS — M25.775 OSTEOPHYTE, LEFT FOOT: ICD-10-CM

## 2022-02-09 DIAGNOSIS — S86.012D RUPTURE OF LEFT ACHILLES TENDON, SUBSEQUENT ENCOUNTER: Primary | ICD-10-CM

## 2022-02-09 PROCEDURE — 97110 THERAPEUTIC EXERCISES: CPT | Performed by: PHYSICAL THERAPIST

## 2022-02-09 PROCEDURE — 97140 MANUAL THERAPY 1/> REGIONS: CPT | Performed by: PHYSICAL THERAPIST

## 2022-02-09 PROCEDURE — 97112 NEUROMUSCULAR REEDUCATION: CPT | Performed by: PHYSICAL THERAPIST

## 2022-02-09 NOTE — PROGRESS NOTES
Daily Note     Today's date: 2022   Patient name: Christopher Angelucci  : 1970  MRN: [id number]  Referring provider: Moncho Buitrago DPM  Dx:   Encounter Diagnosis     ICD-10-CM    1  Rupture of left Achilles tendon, subsequent encounter  S86 012D    2  Osteophyte, left foot  M25 775    3  Post-operative state  Z98 890        Start Time: 1115  Stop Time: 1208  Total time in clinic (min): 53 minutes    Subjective: Patient reports he has had a hard time wearing the brace due to pressure from the ankle cut-out      Objective: See treatment diary below      Assessment: Tolerated treatment well including addition of closed chain 3-way straight leg raises in sneaker  Patient notes increased soreness following longer durations spent in sneaker due to mechanical pressure from shoe  Patient unable to tolerate traditional ankle brace due to heel cut-out causing pressure  Discussed possible use of neoprene sleeve for ankle support without cut-out  Patient exhibited good technique with therapeutic exercises and would benefit from continued PT  Plan: Continue per plan of care        Diagnosis: s/p L Achilles repair and bone graft 21   Precautions: avoid Achilles stretching, WBAT in CAM boot   Primary impairments: decreased ankle mobility/strength, gait dysfunction, and generalized L LE strength deficits   *asterisks by exercise = given for HEP    2/2    Manuals        Posterior talocrural mobilizations  8'  8'  8'  8'  8'   Scar mobilization                                There Ex        Rec bike  L3 x 8'  L2 x 5'  L2 x 5'  L1 x 8'    Ankle DF/PF AROM *     HEP    Ankle inv/ev AROM *     HEP    Ankle circles *  HEP    x 20 cw/ccw    Ankle alphabet *  HEP    x 1    Seated BAPS: DF/PF, inv/ev and circles  L3 x 20 each    L3 x 20 each  L3 x 20 except circles                   Patient education   plan of care/work  bracing     Neuro Re-Ed        3-way ankle band  red x 10 each  red x 15 each  red x 20 each Band ankle PF   red x 10  red x 15     Ankle isometrics  5" x 15 each   5" x 15 each  5" x 10 each  5" x 10 each   Supine SLR *     1 lb 2 x 10  HEP   S/L hip abd *     1 lb 2 x 10  HEP   Prone hip ext *     1 lb 2 x 10  HEP   Standing 3-way SLR  NV   sneaker x 10 each B   x 10 each B                                                           Re-evaluation          Ther Act/Gait                                 Modalities           CP prn

## 2022-02-09 NOTE — TELEPHONE ENCOUNTER
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

## 2022-02-11 ENCOUNTER — OFFICE VISIT (OUTPATIENT)
Dept: PHYSICAL THERAPY | Facility: REHABILITATION | Age: 52
End: 2022-02-11
Payer: OTHER MISCELLANEOUS

## 2022-02-11 DIAGNOSIS — Z98.890 POST-OPERATIVE STATE: ICD-10-CM

## 2022-02-11 DIAGNOSIS — S86.012D RUPTURE OF LEFT ACHILLES TENDON, SUBSEQUENT ENCOUNTER: Primary | ICD-10-CM

## 2022-02-11 DIAGNOSIS — M25.775 OSTEOPHYTE, LEFT FOOT: ICD-10-CM

## 2022-02-11 PROCEDURE — 97112 NEUROMUSCULAR REEDUCATION: CPT | Performed by: PHYSICAL THERAPIST

## 2022-02-11 PROCEDURE — 97140 MANUAL THERAPY 1/> REGIONS: CPT | Performed by: PHYSICAL THERAPIST

## 2022-02-11 PROCEDURE — 97110 THERAPEUTIC EXERCISES: CPT | Performed by: PHYSICAL THERAPIST

## 2022-02-11 NOTE — PROGRESS NOTES
Daily Note     Today's date: 2022  Patient name: Mateusz Pérez  : 1970  MRN: [id number]  Referring provider: Satya Trejo DPM  Dx:   Encounter Diagnosis     ICD-10-CM    1  Rupture of left Achilles tendon, subsequent encounter  S86 012D    2  Osteophyte, left foot  M25 775    3  Post-operative state  Z98 890        Start Time: 1115  Stop Time: 1200  Total time in clinic (min): 45 minutes    Subjective: Patient went back to work yesterday  He was on his feet almost all day in  and has a lot more pain as a result  He reports being in CAM boot as instructed       Objective: See treatment diary below      Assessment: Tolerated treatment well including band resistance progression during 4-way ankle isotonics  Updated HEP to include 4-way ankle and provided red band for home use  Initiated seated heel-toe raises with no adverse responses  Deferred closed chain exercises secondary to increased soreness on arrival  Patient exhibited good technique with therapeutic exercises and would benefit from continued PT  Plan: Continue per plan of care        Diagnosis: s/p L Achilles repair and bone graft 21   Precautions: avoid Achilles stretching, WBAT in CAM boot   Primary impairments: decreased ankle mobility/strength, gait dysfunction, and generalized L LE strength deficits   *asterisks by exercise = given for HEP       Manuals        Posterior talocrural mobilizations  8'  8'  8'  8'  8'   Scar mobilization                                There Ex        Rec bike  L3 x 8'  L2 x 5'  L2 x 5'  L2 x 8'    Ankle DF/PF AROM *        Ankle inv/ev AROM *        Ankle circles *  HEP       Ankle alphabet *  HEP       Seated BAPS: DF/PF, inv/ev and circles  L3 x 20 each     L3 x 20 except circles                   Patient education   plan of care/work  bracing     Neuro Re-Ed        3-way ankle band *  red x 10 each  red x 15 each  red x 20 each  green x 20    Band ankle PF * red x 10  red x 15  green x 20    Ankle isometrics  5" x 15 each   5" x 15 each  D/C  5" x 10 each   Supine SLR *      HEP   S/L hip abd *      HEP   Prone hip ext *      HEP   Standing 3-way SLR  NV   sneaker x 10 each B  NV  x 10 each B   Seated heel/toe raises     2 x 15                                                    Re-evaluation         Ther Act/Gait                              Modalities          CP prn

## 2022-02-16 ENCOUNTER — EVALUATION (OUTPATIENT)
Dept: PHYSICAL THERAPY | Facility: REHABILITATION | Age: 52
End: 2022-02-16
Payer: OTHER MISCELLANEOUS

## 2022-02-16 DIAGNOSIS — M25.775 OSTEOPHYTE, LEFT FOOT: ICD-10-CM

## 2022-02-16 DIAGNOSIS — S86.012D RUPTURE OF LEFT ACHILLES TENDON, SUBSEQUENT ENCOUNTER: Primary | ICD-10-CM

## 2022-02-16 DIAGNOSIS — Z98.890 POST-OPERATIVE STATE: ICD-10-CM

## 2022-02-16 PROCEDURE — 97112 NEUROMUSCULAR REEDUCATION: CPT | Performed by: PHYSICAL THERAPIST

## 2022-02-16 NOTE — PROGRESS NOTES
PT Re-Evaluation     Today's date: 2022  Patient name: Nuno Malhotra  : 1970  MRN: [id number]  Referring provider: Tobi Vilchis DPM  Dx:   Encounter Diagnosis     ICD-10-CM    1  Rupture of left Achilles tendon, subsequent encounter  S86 012D    2  Osteophyte, left foot  M25 775    3  Post-operative state  Z98 890        Start Time: 1407  Stop Time: 1445  Total time in clinic (min): 38 minutes    Assessment  Assessment details: Patient is a 46 y o  male presenting s/p L Achilles repair and bone graft with date of surgery 21  Patient exhibits good progress toward objective and functional goals at time of reexamination  Patient exhibits improvements with ankle ROM in all planes, tolerance to standing/ambulation, and incisional sensitivity since initiating PT however continues to have limitations compared to prior level of function  Patient has been wearing his sneaker approximately one hour per day with minimal to moderate pain aggravation  Remaining functional limitations include sedentary duty at work, inability to ambulate in home and community in normal footwear, stair navigation, and inability to participate in recreational outdoor ambulation  As a result of impairments patient has continued limitations with daily and functional activities and would benefit from continued skilled PT interventions to address these impairments in order to maximize function  Treatment time limited due to patient arrived late       Impairments: abnormal muscle firing, abnormal or restricted ROM, abnormal movement, activity intolerance, impaired physical strength and pain with function     Prognosis: good    Goals  Impairment Goals: 4-6 weeks  - Patient to decrease pain to 2/10 - MET  - Patient to improve ankle AROM to [facility] all planes - MET  - Patient to improve L hip/knee strength to 4+/5 throughout - MET    Functional Goals: by discharge  - Patient to discharge to independent [facility] - PROGRESSING  - Patient to increase FOTO to 64 - PROGRESSING  - Patient to return to work without limitations - PROGRESSING  - Patient to improve L ankle strength to 4+/5 - PROGRESSING  - Patient to lift 70 lbs  floor to waist without compensation/limitations - PROGRESSING    Plan  Patient would benefit from: skilled physical therapy  Planned modality interventions: cryotherapy, TENS and thermotherapy: hydrocollator packs  Planned therapy interventions: flexibility, home exercise program, joint mobilization, manual therapy, neuromuscular re-education, patient education, strengthening, stretching, therapeutic activities, therapeutic exercise and functional ROM exercises  Frequency: 2x week  Duration in weeks: 10  Treatment plan discussed with: patient        Subjective Evaluation    History of Present Illness  Mechanism of injury: HISTORY OF PRESENT ILLNESS: Patient is s/p L Achilles repair with bone graft 21  Patient is in sneaker for 1 hour each day and is sedentary duty at work  Patient feels he is getting better since starting PT  Increased ankle mobility reported  Patient has been tolerating ambulating with CAM boot and limited ambulation with sneaker well  Pain reported with longer duration walking in CAM boot    PRIOR TREATMENT: surgery as detailed above  AGGRAVATING FACTORS: WB, boot   EASING FACTORS: NWB, rest  WORK:  (description in chart)  IMAGING: x-rays and MRI preoperatively  FUNCTIONAL LIMITATIONS: sedentary work, WBAT in [employer], unable to walk recreationally, stair navigation, decreased aerobic capacity, sneaker 1 hour per day  IMPROVEMENTS: ankle mobility, decreased incisional sensitivity, ambulating short durations in sneaker per restrictions, tolerance to standing/ambulation  SUBJECTIVE FUNCTIONAL LEVEL: 50%  PATIENT GOAL: just to heal and get back to work    Pain  Current pain ratin  At best pain ratin  At worst pain ratin  Location: L Achilles          Objective     Palpation     Additional Palpation Details  Incision observed and well healing with no redness, warmth or other signs of infection    Active Range of Motion   Left Ankle/Foot   Dorsiflexion (ke): 16 degrees   Plantar flexion: 40 degrees   Inversion: 25 degrees   Eversion: 22 degrees     Right Ankle/Foot   Dorsiflexion (ke): 10 degrees   Plantar flexion: WFL  Inversion: WFL  Eversion: WFL    Strength/Myotome Testing     Lumbar     Right   Normal strength    Left Hip   Planes of Motion   Flexion: 5  External rotation: 4+  Internal rotation: 5    Left Knee   Flexion: 5  Extension: 5    Left Ankle/Foot   Dorsiflexion: 5  Inversion: 4+  Eversion: 4+    Right Ankle/Foot   Normal strength    Additional Strength Details  DNT PF strength due to postoperative status    Ambulation     Observational Gait     Additional Observational Gait Details  WBAT in CAM boot      Flowsheet Rows      Most Recent Value   PT/OT G-Codes    Current Score 41   Projected Score 61             Diagnosis: s/p L Achilles repair and bone graft 11/23/21   Precautions: avoid Achilles stretching, WBAT in CAM boot   Primary impairments: decreased ankle mobility/strength, gait dysfunction, and generalized L LE strength deficits   *asterisks by exercise = given for HEP    2/2 2/4 2/9 2/11 2/16   Manuals        Posterior talocrural mobilizations  8'  8'  8'  8'    Scar mobilization                                There Ex        Rec bike  L3 x 8'  L2 x 5'  L2 x 5'  L2 x 8'  L3 x 6'   Ankle DF/PF AROM *        Ankle inv/ev AROM *        Ankle circles *  HEP       Ankle alphabet *  HEP       Seated BAPS: DF/PF, inv/ev and circles  L3 x 20 each                       Patient education   plan of care/work  bracing     Neuro Re-Ed        3-way ankle band *  red x 10 each  red x 15 each  red x 20 each  green x 20    Band ankle PF *   red x 10  red x 15  green x 20    Ankle isometrics  5" x 15 each   5" x 15 each  D/C    Supine SLR *        S/L hip abd *        Prone hip ext *        Standing 3-way SLR  NV   sneaker x 10 each B  NV    Seated heel/toe raises     2 x 15                                                    Re-evaluation      CM   Ther Act/Gait                           Modalities         CP prn

## 2022-02-18 ENCOUNTER — OFFICE VISIT (OUTPATIENT)
Dept: PHYSICAL THERAPY | Facility: REHABILITATION | Age: 52
End: 2022-02-18
Payer: OTHER MISCELLANEOUS

## 2022-02-18 DIAGNOSIS — Z98.890 POST-OPERATIVE STATE: ICD-10-CM

## 2022-02-18 DIAGNOSIS — S86.012D RUPTURE OF LEFT ACHILLES TENDON, SUBSEQUENT ENCOUNTER: Primary | ICD-10-CM

## 2022-02-18 DIAGNOSIS — M25.775 OSTEOPHYTE, LEFT FOOT: ICD-10-CM

## 2022-02-18 PROCEDURE — 97140 MANUAL THERAPY 1/> REGIONS: CPT | Performed by: PHYSICAL THERAPIST

## 2022-02-18 PROCEDURE — 97112 NEUROMUSCULAR REEDUCATION: CPT | Performed by: PHYSICAL THERAPIST

## 2022-02-18 NOTE — PROGRESS NOTES
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      Objective: See treatment diary below      Assessment: Tolerated treatment well including addition of mini squats  Antalgic L stance evident during gait with sneaker however patient notes increased soreness on arrival to treatment  Patient exhibited good technique with therapeutic exercises and would benefit from continued PT  Plan: Continue per plan of care        Diagnosis: s/p L Achilles repair and bone graft 21   Precautions: avoid Achilles stretching, WBAT in CAM boot   Primary impairments: decreased ankle mobility/strength, gait dysfunction, and generalized L LE strength deficits   *asterisks by exercise = given for HEP       Manuals        Posterior talocrural mobilizations  8'  8'  8'  8'    Scar mobilization                                There Ex        Rec bike  L1 x 5'  L2 x 5'  L2 x 5'  L2 x 8'  L3 x 6'   Ankle DF/PF AROM *        Ankle inv/ev AROM *        Ankle circles *        Ankle alphabet *        Seated BAPS: DF/PF, inv/ev and circles                        Patient education   plan of care/work  bracing     Neuro Re-Ed        3-way ankle band *  green x 20  red x 15 each  red x 20 each  green x 20    Band ankle PF *  green x 20  red x 10  red x 15  green x 20    Ankle isometrics    5" x 15 each  D/C    Supine SLR *        S/L hip abd *        Prone hip ext *        Standing 3-way SLR *  x 10 each B   sneaker x 10 each B  NV    Seated heel/toe raises  x 20    2 x 15    Mini squats  x 15                                               Re-evaluation      CM   Ther Act/Gait                           Modalities         CP prn

## 2022-02-23 ENCOUNTER — OFFICE VISIT (OUTPATIENT)
Dept: PHYSICAL THERAPY | Facility: REHABILITATION | Age: 52
End: 2022-02-23
Payer: OTHER MISCELLANEOUS

## 2022-02-23 DIAGNOSIS — S86.012D RUPTURE OF LEFT ACHILLES TENDON, SUBSEQUENT ENCOUNTER: Primary | ICD-10-CM

## 2022-02-23 DIAGNOSIS — M25.775 OSTEOPHYTE, LEFT FOOT: ICD-10-CM

## 2022-02-23 DIAGNOSIS — Z98.890 POST-OPERATIVE STATE: ICD-10-CM

## 2022-02-23 PROCEDURE — 97112 NEUROMUSCULAR REEDUCATION: CPT | Performed by: PHYSICAL THERAPIST

## 2022-02-23 PROCEDURE — 97140 MANUAL THERAPY 1/> REGIONS: CPT | Performed by: PHYSICAL THERAPIST

## 2022-02-23 PROCEDURE — 97110 THERAPEUTIC EXERCISES: CPT | Performed by: PHYSICAL THERAPIST

## 2022-02-23 NOTE — PROGRESS NOTES
Daily Note     Today's date: 2022  Patient name: Rocky Martinez  : 1970  MRN: [id number]  Referring provider: Mustapha Pringle DPM  Dx:   Encounter Diagnosis     ICD-10-CM    1  Rupture of left Achilles tendon, subsequent encounter  S86 012D    2  Osteophyte, left foot  M25 775    3  Post-operative state  Z98 890        Start Time: 1407  Stop Time: 1445  Total time in clinic (min): 38 minutes     Subjective: Patient arrived to appointment approximately 5 minutes late due to completing a work-related Webinar  He has a lot of Achilles soreness on arrival      Objective: See treatment diary below      Assessment: Tolerated treatment fair with good tolerance to increased repetitions with ankle band-resisted strengthening  Deferred mini squats due to increased soreness on arrival  Patient exhibited good technique with therapeutic exercises and would benefit from continued PT in order to facilitate unrestricted return to prior level of function  Plan: Continue per plan of care        Diagnosis: s/p L Achilles repair and bone graft 21   Precautions: avoid Achilles stretching, WBAT in CAM boot   Primary impairments: decreased ankle mobility/strength, gait dysfunction, and generalized L LE strength deficits   *asterisks by exercise = given for HEP       Manuals        Posterior talocrural mobilizations  8'  8'  8'  8'    Scar mobilization                                There Ex        Rec bike  L1 x 5'  L1 x 6'  L2 x 5'  L2 x 8'  L3 x 6'   Ankle DF/PF AROM *        Ankle inv/ev AROM *        Ankle circles *        Ankle alphabet *        Seated BAPS: DF/PF, inv/ev and circles                        Patient education    bracing     Neuro Re-Ed        3-way ankle band *  green x 20  green x 25  red x 20 each  green x 20    Band ankle PF *  green x 20  green x 25  red x 15  green x 20    Ankle isometrics    5" x 15 each  D/C    Supine SLR *        S/L hip abd *        Prone hip ext * Standing 3-way SLR *  x 10 each B  x 10 each B  sneaker x 10 each B  NV    Seated heel/toe raises  x 20  x 25   2 x 15    Mini squats  x 15  NV                                              Re-evaluation      CM   Ther Act/Gait                           Modalities         CP prn

## 2022-02-25 ENCOUNTER — OFFICE VISIT (OUTPATIENT)
Dept: PHYSICAL THERAPY | Facility: REHABILITATION | Age: 52
End: 2022-02-25
Payer: OTHER MISCELLANEOUS

## 2022-02-25 DIAGNOSIS — M25.775 OSTEOPHYTE, LEFT FOOT: ICD-10-CM

## 2022-02-25 DIAGNOSIS — Z98.890 POST-OPERATIVE STATE: ICD-10-CM

## 2022-02-25 DIAGNOSIS — S86.012D RUPTURE OF LEFT ACHILLES TENDON, SUBSEQUENT ENCOUNTER: Primary | ICD-10-CM

## 2022-02-25 PROCEDURE — 97112 NEUROMUSCULAR REEDUCATION: CPT | Performed by: PHYSICAL THERAPIST

## 2022-02-25 PROCEDURE — 97140 MANUAL THERAPY 1/> REGIONS: CPT | Performed by: PHYSICAL THERAPIST

## 2022-02-25 NOTE — PROGRESS NOTES
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 which has involved more time on his feet      Objective: See treatment diary below      Assessment: Tolerated treatment well including addition of ankle PF isometrics into towel in sitting  Educated patient regarding scar mobilization and desensitizing massage  Good accessory mobility appreciated during posterior talocrural mobilizations  Patient exhibited good technique with therapeutic exercises and would benefit from continued PT  Plan: Continue per plan of care        Diagnosis: s/p L Achilles repair and bone graft 21   Precautions: avoid Achilles stretching, WBAT in CAM boot   Primary impairments: decreased ankle mobility/strength, gait dysfunction, and generalized L LE strength deficits   *asterisks by exercise = given for HEP       Manuals        Posterior talocrural mobilizations  8'  8'  8'  8'    Scar mobilization                                There Ex        Rec bike  L1 x 5'  L1 x 6'  L2 x 6' with sneaker  L2 x 8'  L3 x 6'   Ankle DF/PF AROM *        Ankle inv/ev AROM *        Ankle circles *        Ankle alphabet *        Seated BAPS: DF/PF, inv/ev and circles                        Patient education        Neuro Re-Ed        3-way ankle band *  green x 20  green x 25  green x 25  green x 20  green x 20   Band ankle PF *  green x 20  green x 25  green x 25  green x 20  green x 20   Ankle isometrics     D/C    Supine SLR *        S/L hip abd *        Prone hip ext *        Standing 3-way SLR *  x 10 each B  x 10 each B  HEP  NV    Seated heel/toe raises  x 20  x 25  x 25  2 x 15    Mini squats  x 15  NV  x 15     Seated PF isometric into towel    5" x 15     Standing heel-toe rocks    x 15                             Re-evaluation      CM   Ther Act/Gait                          Modalities        CP prn

## 2022-03-02 ENCOUNTER — OFFICE VISIT (OUTPATIENT)
Dept: PHYSICAL THERAPY | Facility: REHABILITATION | Age: 52
End: 2022-03-02
Payer: OTHER MISCELLANEOUS

## 2022-03-02 DIAGNOSIS — S86.012D RUPTURE OF LEFT ACHILLES TENDON, SUBSEQUENT ENCOUNTER: Primary | ICD-10-CM

## 2022-03-02 DIAGNOSIS — M25.775 OSTEOPHYTE, LEFT FOOT: ICD-10-CM

## 2022-03-02 PROCEDURE — 97112 NEUROMUSCULAR REEDUCATION: CPT | Performed by: PHYSICAL THERAPIST

## 2022-03-02 NOTE — PROGRESS NOTES
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 on his feet  He was in the CAM boot and states it went well but he had soreness later in the day      Objective: See treatment diary below      Assessment: Tolerated treatment well including progression to backward ambulation on treadmill  Initiated heel raises in standing with tendency to shift weight to uninvolved side  Patient able to ambulate with good heel strike and appropriate gait pattern in sneaker with verbal cueing  Patient exhibited good technique with therapeutic exercises and would benefit from continued PT  Plan: Continue per plan of care        Diagnosis: s/p L Achilles repair and bone graft 21   Precautions: avoid Achilles stretching, WBAT in CAM boot   Primary impairments: decreased ankle mobility/strength, gait dysfunction, and generalized L LE strength deficits   *asterisks by exercise = given for HEP    2/18 2/23 2/25 3/2 2/16   Manuals        Posterior talocrural mobilizations  8'  8'  8'     Scar mobilization                                There Ex        Rec bike  L1 x 5'  L1 x 6'  L2 x 6' with sneaker  L3 x 5'   L3 x 6'   Ankle DF/PF AROM *        Ankle inv/ev AROM *        Ankle circles *        Ankle alphabet *        Seated BAPS: DF/PF, inv/ev and circles                        Patient education        Neuro Re-Ed        Retro amb on treadmill     1 mph x 5'    3-way ankle band *  green x 20  green x 25  green x 25  HEP  green x 20   Band ankle PF *  green x 20  green x 25  green x 25  HEP  green x 20   Ankle isometrics        Supine SLR * S/L hip abd *        Prone hip ext *        Standing 3-way SLR *  x 10 each B  x 10 each B  HEP     Seated heel/toe raises  x 20  x 25  x 25  x 20    Mini squats  x 15  NV  x 15  x 15    Seated PF isometric into towel    5" x 15  5" x 15    Standing heel raises and toe raises    x 15  x 20 each    Standing heel raises     x 15                    Re-evaluation      CM   Ther Act/Gait                          Modalities        CP prn

## 2022-03-03 ENCOUNTER — OFFICE VISIT (OUTPATIENT)
Dept: PODIATRY | Facility: CLINIC | Age: 52
End: 2022-03-03
Payer: OTHER MISCELLANEOUS

## 2022-03-03 VITALS — HEIGHT: 69 IN | BODY MASS INDEX: 37.03 KG/M2 | WEIGHT: 250 LBS

## 2022-03-03 DIAGNOSIS — S86.012A RUPTURE OF LEFT ACHILLES TENDON, INITIAL ENCOUNTER: Primary | ICD-10-CM

## 2022-03-03 PROCEDURE — 99213 OFFICE O/P EST LOW 20 MIN: CPT | Performed by: PODIATRIST

## 2022-03-03 NOTE — PROGRESS NOTES
PATIENT:  Nusrat Abts      1970      ASSESSMENT     1  Rupture of left Achilles tendon, initial encounter            PLAN  Wean off CAM walker  Continue PT/OT  Compession stockings for residual edema  Continue modified work duty / sedentary work  HISTORY OF PRESENT ILLNESS  Patient presents for follow-up  He is doing well with decreased pain and swelling  Tolerates WB well  Doing well at PT  Still has some swelling  Tolerates sedentary duty at work          PAST MEDICAL HISTORY:  Past Medical History:   Diagnosis Date    Borderline diabetes     Fractures     Heart murmur of      History of chest pain     , "had full cardio workup LVH/all good thought related to work stress"    History of Graves' disease 2021    "had radioactive iodine"    Hyperlipidemia     Hypertension     Left ankle pain     Moderate exercise     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    Obesity (BMI 35 0-39 9 without comorbidity)     Teeth missing     Wears glasses        PAST SURGICAL HISTORY:  Past Surgical History:   Procedure Laterality Date    APPENDECTOMY      BONE EXOSTOSIS EXCISION Left 2021    Procedure: EXCISION EXOSTOSIS HEEL;  Surgeon: Rena Chisholm DPM;  Location: [facility];  Service: Podiatry    CARDIAC CATHETERIZATION      approx  or so, pt reports "no blockages"    COLONOSCOPY      HAND SURGERY      beebee pellet from hand removed        ALLERGIES:  Bee venom    MEDICATIONS:  Current Outpatient Medications   Medication Sig Dispense Refill    acetaminophen (TYLENOL) 500 mg tablet Take 500 mg by mouth every 6 (six) hours as needed for mild pain      amLODIPine (NORVASC) 5 mg tablet Take 5 mg by mouth      aspirin (ECOTRIN LOW STRENGTH) 81 mg EC tablet Take by mouth      atorvastatin (LIPITOR) 40 mg tablet       Diclofenac Sodium (VOLTAREN) 1 % Apply 2 g topically 4 (four) times a day 150 g 2    EPINEPHrine (EPIPEN) 0 3 mg/0 3 mL SOAJ Inject 0 3 mg into a muscle      fluticasone (FLONASE) 50 mcg/act nasal spray 1-2 sprays into each nostril as needed        hydrochlorothiazide (HYDRODIURIL) 25 mg tablet Take 25 mg by mouth daily      levalbuterol (XOPENEX HFA) 45 mcg/act inhaler Inhale as needed        levothyroxine 137 mcg tablet       loratadine (CLARITIN) 10 mg tablet Take 10 mg by mouth daily      metFORMIN (GLUCOPHAGE-XR) 500 mg 24 hr tablet Take 500 mg by mouth daily with breakfast        naproxen (NAPROSYN) 500 mg tablet as needed        omeprazole (PriLOSEC) 20 mg delayed release capsule       oxyCODONE-acetaminophen (Percocet) 5-325 mg per tablet Take 1 tablet by mouth every 6 (six) hours as needed for severe pain for up to 20 doses Max Daily Amount: 4 tablets 20 tablet 0    propranolol (INDERAL) 20 mg tablet TAKE 1 TABLET BY MOUTH TWICE A DAY      terbinafine (LamISIL) 250 mg tablet Take 250 mg by mouth daily        apixaban (Eliquis) 2 5 mg Take 1 tablet (2 5 mg total) by mouth 2 (two) times a day 60 tablet 0    chlorhexidine (PERIDEX) 0 12 % solution  (Patient not taking: Reported on 11/5/2021)      [facility] Aspirin EC 81 MG EC tablet  (Patient not taking: Reported on 1/6/2022 )      ibuprofen (MOTRIN) 600 mg tablet  (Patient not taking: Reported on 11/5/2021)       No current facility-administered medications for this visit         SOCIAL HISTORY:  Social History     Socioeconomic History    Marital status: Single     Spouse name: None    Number of children: None    Years of education: None    Highest education level: None   Occupational History    None   Tobacco Use    Smoking status: Never Smoker    Smokeless tobacco: Never Used   Vaping Use    Vaping Use: Never used   Substance and Sexual Activity    Alcohol use: Not Currently    Drug use: Not Currently    Sexual activity: None     Comment: defer   Other Topics Concern    None   Social History Narrative    None     Social Determinants of Health     Financial Resource Strain: Not on file   Food Insecurity: Not on file   Transportation Needs: Not on file   Physical Activity: Not on file   Stress: Not on file   Social Connections: Not on file   Intimate Partner Violence: Not on file   Housing Stability: Not on file        REVIEW OF SYSTEMS  GENERAL: No fever or chills  HEART: No chest pain, or palpitation  RESPIRATORY:  No SOB or cough  GI: No Nausea, vomit or diarrhea  NEUROLOGIC: No syncope or acute weakness  MUSCULOSKELETAL: No calf pain or edema  PHYSICAL EXAMINATION  GENERAL  The patient appears in NAD / non-toxic  Afebrile  VSS    VASCULAR EXAM  Pedal pulses and vascular status are intact  No calf pain or edema bilaterally  No cyanosis  DERMATOLOGIC EXAM  No wound  No signs of infection  No drainage  No necrosis or dehiscence  Mild residual edema LLE  NEUROLOGIC EXAM  AAO X 3  No focal neurologic deficit  Neurologic status is intact BLE  MUSCULOSKELETAL EXAM  Good surgical correction  Achilles tendon intact left  Mild weakness plantarflexion left foot  Negative Mcneil sign  Normal post-op findings  Improved left ankle ROM  No fluctuation or crepitus

## 2022-03-04 ENCOUNTER — TELEPHONE (OUTPATIENT)
Dept: PODIATRY | Facility: CLINIC | Age: 52
End: 2022-03-04

## 2022-03-04 ENCOUNTER — OFFICE VISIT (OUTPATIENT)
Dept: PHYSICAL THERAPY | Facility: REHABILITATION | Age: 52
End: 2022-03-04
Payer: OTHER MISCELLANEOUS

## 2022-03-04 DIAGNOSIS — M25.775 OSTEOPHYTE, LEFT FOOT: ICD-10-CM

## 2022-03-04 DIAGNOSIS — S86.012D RUPTURE OF LEFT ACHILLES TENDON, SUBSEQUENT ENCOUNTER: Primary | ICD-10-CM

## 2022-03-04 PROCEDURE — 97112 NEUROMUSCULAR REEDUCATION: CPT | Performed by: PHYSICAL THERAPIST

## 2022-03-04 NOTE — PROGRESS NOTES
Daily Note     Today's date: 3/4/2022  Patient name: Mateusz Pérez  : 1970  MRN: [id number]  Referring provider: Satya Trejo DPM  Dx:   Encounter Diagnosis     ICD-10-CM    1  Rupture of left Achilles tendon, subsequent encounter  S86 012D    2  Osteophyte, left foot  M25 775        Start Time: 1118  Stop Time: 1203  Total time in clinic (min): 45 minutes    Subjective: Patient had follow up with surgeon yesterday  No change with work restrictions and patient is able to continue weaning into shoe      Objective: See treatment diary below      Assessment: Tolerated treatment well  Deferred standing heel raises due to significant soreness following last treatment  Plan to resume closed chain heel raises next visit as able  Patient exhibited good technique with therapeutic exercises and would benefit from continued PT  Plan: Continue per plan of care        Diagnosis: s/p L Achilles repair and bone graft 21   Precautions: avoid Achilles stretching, WBAT in CAM boot   Primary impairments: decreased ankle mobility/strength, gait dysfunction, and generalized L LE strength deficits   *asterisks by exercise = given for HEP    2/18 2/23 2/25 3/2 3/4   Manuals        Posterior talocrural mobilizations  8'  8'  8'     Scar mobilization                                There Ex        Rec bike  L1 x 5'  L1 x 6'  L2 x 6' with sneaker  L3 x 5'   L3 x 5'   Ankle DF/PF AROM *        Ankle inv/ev AROM *        Ankle circles *        Ankle alphabet *        Seated BAPS: DF/PF, inv/ev and circles                        Patient education        Neuro Re-Ed        Retro amb on treadmill     1 mph x 5'  1 mph x 5'   3-way ankle band *  green x 20  green x 25  green x 25  HEP    Band ankle PF *  green x 20  green x 25  green x 25  HEP    Ankle isometrics        Supine SLR *        S/L hip abd *        Prone hip ext *        Standing 3-way SLR *  x 10 each B  x 10 each B  HEP     Seated heel/toe raises  x 20  x 25  x 25  x 20  x 20   Mini squats  x 15  NV  x 15  x 15  x 15   Seated PF isometric into towel    5" x 15  5" x 15  5" x 15   Standing heel/toe rocks    x 15  x 20 each  x 20 each   Standing heel raises     x 15  NV                   Re-evaluation        Ther Act/Gait                          Modalities        CP prn                      HEP: [facility]

## 2022-03-04 NOTE — TELEPHONE ENCOUNTER
Pt already spoke with Luz Marina Asher earlier today, and she did explain to him that the paperwork he is asking for is going to be filled out as soon as possible, but it may up to Tuesday or so to be fully completed

## 2022-03-07 ENCOUNTER — OFFICE VISIT (OUTPATIENT)
Dept: PHYSICAL THERAPY | Facility: REHABILITATION | Age: 52
End: 2022-03-07
Payer: OTHER MISCELLANEOUS

## 2022-03-07 DIAGNOSIS — Z98.890 POST-OPERATIVE STATE: ICD-10-CM

## 2022-03-07 DIAGNOSIS — S86.012D RUPTURE OF LEFT ACHILLES TENDON, SUBSEQUENT ENCOUNTER: Primary | ICD-10-CM

## 2022-03-07 DIAGNOSIS — M25.775 OSTEOPHYTE, LEFT FOOT: ICD-10-CM

## 2022-03-07 PROCEDURE — 97112 NEUROMUSCULAR REEDUCATION: CPT | Performed by: PHYSICAL THERAPIST

## 2022-03-07 NOTE — PROGRESS NOTES
Daily Note     Today's date: 3/7/2022  Patient name: Rolando Huynh  : 1970  MRN: [id number]  Referring provider: Dennis Zambrano DPM  Dx:   Encounter Diagnosis     ICD-10-CM    1  Rupture of left Achilles tendon, subsequent encounter  S86 012D    2  Osteophyte, left foot  M25 775    3  Post-operative state  [id number]        Start Time: 1700  Stop Time: 1745  Total time in clinic (min): 45 minutes    Subjective: Patient was on his feet a good bit while working without much soreness  He felt fine after last visit      Objective: See treatment diary below      Assessment: Tolerated treatment well  Resumed heel raises with with good tolerance and technique and reduced pain compared to last visit  Discussed wearing schedule to wean from boot to sneaker and instructed patient to increase to 2 hours per day in sneaker  Initiated tandem ambulation and single leg stance with no adverse responses  Patient exhibited good technique with therapeutic exercises and would benefit from continued PT  Plan: Continue per plan of care        Diagnosis: s/p L Achilles repair and bone graft 21   Precautions: avoid Achilles stretching, WBAT in CAM boot   Primary impairments: decreased ankle mobility/strength, gait dysfunction, and generalized L LE strength deficits   *asterisks by exercise = given for HEP    3/7 2/23 2/25 3/2 3/4   Manuals        Posterior talocrural mobilizations   8'  8'     Scar mobilization                                There Ex        Rec bike   L1 x 6'  L2 x 6' with sneaker  L3 x 5'   L3 x 5'   Ankle DF/PF AROM *        Ankle inv/ev AROM *        Ankle circles *        Ankle alphabet *        Seated BAPS: DF/PF, inv/ev and circles                        Patient education        Neuro Re-Ed        Retro amb on treadmill  1 5 mph x 5'    1 mph x 5'  1 mph x 5'   3-way ankle band *   green x 25  green x 25  HEP    Band ankle PF *   green x 25  green x 25  HEP    Ankle isometrics        Supine SLR * S/L hip abd *        Prone hip ext *        Standing 3-way SLR *   x 10 each B  HEP     Seated heel/toe raises  2 x 20  x 25  x 25  x 20  x 20   Mini squats  x 15  NV  x 15  x 15  x 15   Seated PF isometric into towel  5" x 15   5" x 15  5" x 15  5" x 15   Standing heel/toe rocks  x 20   x 15  x 20 each  x 20 each   Standing heel raises  x 15    x 15  NV   SLS  10" x 5       Tandem amb  x 2 laps                       Re-evaluation        Ther Act/Gait                          Modalities        CP prn                      HEP: [facility]

## 2022-03-10 ENCOUNTER — OFFICE VISIT (OUTPATIENT)
Dept: PHYSICAL THERAPY | Facility: REHABILITATION | Age: 52
End: 2022-03-10
Payer: OTHER MISCELLANEOUS

## 2022-03-10 DIAGNOSIS — S86.012D RUPTURE OF LEFT ACHILLES TENDON, SUBSEQUENT ENCOUNTER: Primary | ICD-10-CM

## 2022-03-10 DIAGNOSIS — M25.775 OSTEOPHYTE, LEFT FOOT: ICD-10-CM

## 2022-03-10 PROCEDURE — 97112 NEUROMUSCULAR REEDUCATION: CPT | Performed by: PHYSICAL THERAPIST

## 2022-03-10 NOTE — PROGRESS NOTES
Daily Note     Today's date: 3/10/2022  Patient name: Desiree Hodge  : 1970  MRN: [id number]  Referring provider: Kailee Gomez DPM  Dx:   Encounter Diagnosis     ICD-10-CM    1  Rupture of left Achilles tendon, subsequent encounter  S86 012D    2  Osteophyte, left foot  M25 775        Start Time: 1700  Stop Time: 1745  Total time in clinic (min): 45 minutes    Subjective: Patient has been out of boot full time for 3 days and has been feeling well  He has started to do some heel raises at home and feels his walking is improving with less sensitivity      Objective: See treatment diary below      Assessment: Tolerated treatment well with good tolerance to standing heel raises and improved gait pattern compared to past visits  Instructed patient to utilize boot as needed for symptom exacerbations as he has weaned fully into sneaker over the past few days  Improving single leg stability noted  Patient exhibited good technique with therapeutic exercises and would benefit from continued PT  Plan: Continue per plan of care        Diagnosis: s/p L Achilles repair and bone graft 21   Precautions: avoid Achilles stretching, WBAT in CAM boot   Primary impairments: decreased ankle mobility/strength, gait dysfunction, and generalized L LE strength deficits   *asterisks by exercise = given for HEP    3/7 3/10 2/25 3/2 3/4   Manuals        Posterior talocrural mobilizations    8'     Scar mobilization                                There Ex        Rec bike   L4 x 5'  L2 x 6' with sneaker  L3 x 5'   L3 x 5'   Ankle DF/PF AROM *        Ankle inv/ev AROM *        Ankle circles *        Ankle alphabet *        Seated BAPS: DF/PF, inv/ev and circles                        Patient education        Neuro Re-Ed        Retro amb on treadmill  1 5 mph x 5'  1 5 mph x 5'   1 mph x 5'  1 mph x 5'   3-way ankle band *    green x 25  HEP    Band ankle PF *    green x 25  HEP    Ankle isometrics        Supine SLR *        S/L hip abd *        Prone hip ext *        Standing 3-way SLR *    HEP     Seated heel/toe raises  2 x 20  2 x 20  x 25  x 20  x 20   Mini squats  x 15  2 x 10  x 15  x 15  x 15   Seated PF isometric into towel  5" x 15   5" x 15  5" x 15  5" x 15   Standing heel/toe rocks  x 20   x 15  x 20 each  x 20 each   Standing heel raises  x 15  2 x 10   x 15  NV   Standing toe raises   x 15      SLS  10" x 5  15" x 5      Tandem amb  x 2 laps  x 2 laps      Retro tandem   x 2 laps              Re-evaluation        Ther Act/Gait                          Modalities        CP prn                      HEP: [facility]

## 2022-03-14 ENCOUNTER — EVALUATION (OUTPATIENT)
Dept: PHYSICAL THERAPY | Facility: REHABILITATION | Age: 52
End: 2022-03-14
Payer: OTHER MISCELLANEOUS

## 2022-03-14 DIAGNOSIS — S86.012D RUPTURE OF LEFT ACHILLES TENDON, SUBSEQUENT ENCOUNTER: Primary | ICD-10-CM

## 2022-03-14 DIAGNOSIS — M25.775 OSTEOPHYTE, LEFT FOOT: ICD-10-CM

## 2022-03-14 PROCEDURE — 97112 NEUROMUSCULAR REEDUCATION: CPT | Performed by: PHYSICAL THERAPIST

## 2022-03-14 NOTE — PROGRESS NOTES
PT Re-Evaluation     Today's date: 3/14/2022  Patient name: Cyn Dumas  : 1970  MRN: [id number]  Referring provider: Cliff Shelton DPM  Dx:   Encounter Diagnosis     ICD-10-CM    1  Rupture of left Achilles tendon, subsequent encounter  S86 012D    2  Osteophyte, left foot  M25 775        Start Time: 1658   Stop Time: 1745  Total time in clinic (min): 47 minutes    Assessment  Assessment details: Patient is a 46 y o  male presenting s/p L Achilles repair and bone graft with date of surgery 21  Patient exhibits good progress toward objective and functional goals at time of reexamination  Patient exhibits improvements with ambulation tolerance, ankle mobility/strength, and tolerance to prolonged standing while working since initiating PT however continues to have limitations compared to prior level of function  Patient is now wearing sneakers full time with CAM boot as needed if soreness increases  Remaining functional limitations include sedentary duty restrictions while working, inability to return to recreational outdoor ambulation, and stair navigation  Patient achieved FOTO score of 56 indicating a 15 point improvement since prior assessment  As a result of impairments patient has continued limitations with daily and functional activities and would benefit from continued skilled PT interventions to address these impairments in order to maximize function         Impairments: abnormal muscle firing, abnormal or restricted ROM, abnormal movement, activity intolerance, impaired physical strength and pain with function     Prognosis: good    Goals  Impairment Goals: 4-6 weeks  - Patient to decrease pain to 2/10 - MET  - Patient to improve ankle AROM to [facility] all planes - MET  - Patient to improve L hip/knee strength to 4+/5 throughout - MET    Functional Goals: by discharge  - Patient to discharge to independent HEP - PROGRESSING  - Patient to increase FOTO to 64 - PROGRESSING  - Patient to return to work without limitations - PROGRESSING  - Patient to improve L ankle strength to 4+/5 - PROGRESSING  - Patient to lift 70 lbs  floor to waist without compensation/limitations - PROGRESSING    Plan  Patient would benefit from: skilled physical therapy  Planned modality interventions: cryotherapy, TENS and thermotherapy: hydrocollator packs  Planned therapy interventions: flexibility, home exercise program, joint mobilization, manual therapy, neuromuscular re-education, patient education, strengthening, stretching, therapeutic activities, therapeutic exercise and functional ROM exercises  Frequency: 2x week  Duration in weeks: 8  Treatment plan discussed with: patient        Subjective Evaluation    History of Present Illness  Mechanism of injury: HISTORY OF PRESENT ILLNESS: Patient is s/p L Achilles repair with bone graft 21  Patient has been in sneaker full time for approximately one week with no major setbacks  Patient is happy with ankle mobility and feels strength is coming along  He remains sedentary duty at work but is on his feet a good bit  He continues to have some sensitivity around the incision     PRIOR TREATMENT: surgery as detailed above  AGGRAVATING FACTORS: WB, strengthening exercises  EASING FACTORS: NWB, rest  WORK:  (description in chart)  IMAGING: x-rays and MRI preoperatively  FUNCTIONAL LIMITATIONS: sedentary work, unable to walk recreationally, stair navigation step to step  IMPROVEMENTS: ankle mobility, decreased incisional sensitivity, wearing sneaker full time, tolerance to standing/ambulation  SUBJECTIVE FUNCTIONAL LEVEL: 70% improvement with PT  PATIENT GOAL: just to heal and get back to work    Pain  Current pain ratin  At best pain ratin  At worst pain ratin  Location: L Achilles          Objective     Active Range of Motion   Left Ankle/Foot   Dorsiflexion (ke): 17 degrees   Plantar flexion: 40 degrees   Inversion: 25 degrees   Eversion: 25 degrees     Right Ankle/Foot   Dorsiflexion (ke): 10 degrees   Plantar flexion: WFL  Inversion: WFL  Eversion: WFL    Strength/Myotome Testing     Lumbar     Right   Normal strength    Left Hip   Planes of Motion   Flexion: 5  External rotation: 5  Internal rotation: 5    Left Knee   Flexion: 5  Extension: 5    Left Ankle/Foot   Dorsiflexion: 5  Inversion: 5  Eversion: 5    Right Ankle/Foot   Normal strength    Additional Strength Details  DNT PF strength via single leg heel raise test due to postoperative status    Ambulation     Observational Gait     Additional Observational Gait Details  No significant deviations ambulating with sneaker      Flowsheet Rows      Most Recent Value   PT/OT G-Codes    Current Score 56   Projected Score 61              Diagnosis: s/p L Achilles repair and bone graft 11/23/21   Precautions: avoid Achilles stretching, WBAT in CAM boot   Primary impairments: decreased ankle mobility/strength, gait dysfunction, and generalized L LE strength deficits   *asterisks by exercise = given for HEP    3/7 3/10 3/14 3/2 3/4   Manuals        Posterior talocrural mobilizations        Scar mobilization                                There Ex        Rec bike   L4 x 5'   L3 x 5'   L3 x 5'   Ankle DF/PF AROM *        Ankle inv/ev AROM *        Ankle circles *        Ankle alphabet *        Seated BAPS: DF/PF, inv/ev and circles                        Patient education        Neuro Re-Ed        Retro amb on treadmill  1 5 mph x 5'  1 5 mph x 5'  1 5 mph x 5'  1 mph x 5'  1 mph x 5'   3-way ankle band *     HEP    Band ankle PF *     HEP    Ankle isometrics        Supine SLR *        S/L hip abd *        Prone hip ext *        Standing 3-way SLR *        Seated heel/toe raises  2 x 20  2 x 20   x 20  x 20   Mini squats  x 15  2 x 10   x 15  x 15   Seated PF isometric into towel  5" x 15    5" x 15  5" x 15   Standing heel/toe rocks  x 20    x 20 each  x 20 each   Standing heel raises  x 15  2 x 10   x 15  NV   Standing toe raises   x 15  2 x 10     SLS  10" x 5  15" x 5      Tandem amb  x 2 laps  x 2 laps  x 2 laps     Retro tandem   x 2 laps  x 2 laps             Re-evaluation    CM     Ther Act/Gait                          Modalities        CP prn                      HEP: [facility]

## 2022-03-17 ENCOUNTER — OFFICE VISIT (OUTPATIENT)
Dept: PHYSICAL THERAPY | Facility: REHABILITATION | Age: 52
End: 2022-03-17
Payer: OTHER MISCELLANEOUS

## 2022-03-17 DIAGNOSIS — S86.012D RUPTURE OF LEFT ACHILLES TENDON, SUBSEQUENT ENCOUNTER: Primary | ICD-10-CM

## 2022-03-17 DIAGNOSIS — M25.775 OSTEOPHYTE, LEFT FOOT: ICD-10-CM

## 2022-03-17 PROCEDURE — 97112 NEUROMUSCULAR REEDUCATION: CPT | Performed by: PHYSICAL THERAPIST

## 2022-03-17 PROCEDURE — 97110 THERAPEUTIC EXERCISES: CPT | Performed by: PHYSICAL THERAPIST

## 2022-03-17 NOTE — PROGRESS NOTES
Daily Note     Today's date: 3/17/2022  Patient name: Nusrat Epps  : 1970  MRN: [id number]  Referring provider: Radha Crawford DPM  Dx:   Encounter Diagnosis     ICD-10-CM    1  Rupture of left Achilles tendon, subsequent encounter  S86 012D    2  Osteophyte, left foot  M25 775        Start Time: 1749  Stop Time: 1830  Total time in clinic (min): 41 minutes    Subjective: Patient notes he had small section of incision superficially with slight drainage      Objective: See treatment diary below      Assessment: Visualized incision secondary to small open section  Incision well healed with superficial opening associated with small amount of drainage  Patient denies any recent changes in footwear  Slight discoloration/bruising noted medial calcaneus  Instructed patient to send message to surgeon with picture of incision  Treatment limited secondary to increased pain and incisional irritation  No significant loss of motion noted and patient able to complete standing heel raise  Negative Mcneil's test  Patient would benefit from continued PT  Plan: Continue per plan of care        Diagnosis: s/p L Achilles repair and bone graft 21   Precautions: avoid Achilles stretching, WBAT in CAM boot   Primary impairments: decreased ankle mobility/strength, gait dysfunction, and generalized L LE strength deficits   *asterisks by exercise = given for HEP    3/7 3/10 3/14 3/17 3/4   Manuals        Posterior talocrural mobilizations        Scar mobilization                                There Ex        Rec bike   L4 x 5'    L3 x 5'   Ankle DF/PF AROM *        Ankle inv/ev AROM *        Ankle circles *        Ankle alphabet *        Seated BAPS: DF/PF, inv/ev and circles                        Patient education     CM    Neuro Re-Ed        Retro amb on treadmill  1 5 mph x 5'  1 5 mph x 5'  1 5 mph x 5'  1 5 mph x 6'  1 mph x 5'   3-way ankle band *        Band ankle PF *        Ankle isometrics        Supine SLR * S/L hip abd *        Prone hip ext *        Standing 3-way SLR *        Seated heel/toe raises  2 x 20  2 x 20   x 20  x 20   Mini squats  x 15  2 x 10    x 15   Seated PF isometric into towel  5" x 15    5" x 15  5" x 15   Standing heel/toe rocks  x 20     x 20 each   Standing heel raises  x 15  2 x 10    NV   Standing toe raises   x 15  2 x 10     SLS  10" x 5  15" x 5      Tandem amb  x 2 laps  x 2 laps  x 2 laps     Retro tandem   x 2 laps  x 2 laps             Re-evaluation    CM     Ther Act/Gait                          Modalities        CP prn                      HEP: [facility]

## 2022-03-18 ENCOUNTER — TELEPHONE (OUTPATIENT)
Dept: PODIATRY | Facility: CLINIC | Age: 52
End: 2022-03-18

## 2022-03-18 NOTE — TELEPHONE ENCOUNTER
Patient called  He is wondering what he should do about the wound located on his incision  Please advise  Thank  You

## 2022-03-21 ENCOUNTER — OFFICE VISIT (OUTPATIENT)
Dept: PHYSICAL THERAPY | Facility: REHABILITATION | Age: 52
End: 2022-03-21
Payer: OTHER MISCELLANEOUS

## 2022-03-21 DIAGNOSIS — M25.775 OSTEOPHYTE, LEFT FOOT: ICD-10-CM

## 2022-03-21 DIAGNOSIS — S86.012D RUPTURE OF LEFT ACHILLES TENDON, SUBSEQUENT ENCOUNTER: Primary | ICD-10-CM

## 2022-03-21 PROCEDURE — 97112 NEUROMUSCULAR REEDUCATION: CPT | Performed by: PHYSICAL THERAPIST

## 2022-03-21 NOTE — PROGRESS NOTES
Daily Note     Today's date: 3/21/2022  Patient name: Suhail Buchanan  : 1970  MRN: [id number]  Referring provider: Lissette Cristina DPM  Dx:   Encounter Diagnosis     ICD-10-CM    1  Rupture of left Achilles tendon, subsequent encounter  S86 012D    2  Osteophyte, left foot  M25 775        Start Time: 1745  Stop Time: 1830  Total time in clinic (min): 45 minutes    Subjective: Patient reports he had some soreness after last visit  He has a follow-up Wednesday with surgeon regarding incision      Objective: See treatment diary below      Assessment: Tolerated treatment well with resumption of closed chain strengthening and initiation of forward and lateral step ups  Inspected incision with scab noted over superficial portion of incision that was open last visit  Patient challenged with control during forward step downs  Patient demonstrated fatigue post treatment and would benefit from continued PT  Plan: Continue per plan of care        Diagnosis: s/p L Achilles repair and bone graft 21   Precautions: avoid Achilles stretching, WBAT in CAM boot   Primary impairments: decreased ankle mobility/strength, gait dysfunction, and generalized L LE strength deficits   *asterisks by exercise = given for HEP    3/7 3/10 3/14 3/17 3/21   Manuals        Posterior talocrural mobilizations        Scar mobilization                                There Ex        Rec bike   L4 x 5'      Ankle DF/PF AROM *        Ankle inv/ev AROM *        Ankle circles *        Ankle alphabet *        Seated BAPS: DF/PF, inv/ev and circles                        Patient education     CM    Neuro Re-Ed        Retro amb on treadmill  1 5 mph x 5'  1 5 mph x 5'  1 5 mph x 5'  1 5 mph x 6'  1 5 mph x 6'   3-way ankle band *        Band ankle PF *        Ankle isometrics        Supine SLR *        S/L hip abd *        Prone hip ext *        Standing 3-way SLR *        Seated heel/toe raises  2 x 20  2 x 20   x 20    Mini squats  x 15  2 x 10 Seated PF isometric into towel  5" x 15    5" x 15    Standing heel/toe rocks  x 20       Standing heel raises  x 15  2 x 10    2 x 10   Standing toe raises   x 15  2 x 10   2 x 10   SLS on foam      10" x 5   Tandem amb  x 2 laps  x 2 laps  x 2 laps   x 2 laps   Retro tandem   x 2 laps  x 2 laps   x 2 laps   Fwd step ups      1 riser x 15   Lat step ups      1 riser x 15   Fwd step down      no risers x 10   Sidestepping      red x 2 laps                           Re-evaluation    CM     Ther Act/Gait                          Modalities        CP prn                     HEP: COL[facility]HN [facility]AR[facility]

## 2022-03-21 NOTE — TELEPHONE ENCOUNTER
Called pt let him know that Dr Kathy Watt is out this week  He said that he has what looks like a blister or wound on top of the incision on his foot, he did put antibiotic ointment on it throughout the weekend  He also has some swelling of the foot  Made an appt to be seen with Dr Jaffe Back on Wednesday

## 2022-03-23 ENCOUNTER — OFFICE VISIT (OUTPATIENT)
Dept: PODIATRY | Facility: CLINIC | Age: 52
End: 2022-03-23
Payer: OTHER MISCELLANEOUS

## 2022-03-23 VITALS
HEIGHT: 69 IN | DIASTOLIC BLOOD PRESSURE: 79 MMHG | BODY MASS INDEX: 36.92 KG/M2 | SYSTOLIC BLOOD PRESSURE: 123 MMHG | HEART RATE: 59 BPM

## 2022-03-23 DIAGNOSIS — S86.012A RUPTURE OF LEFT ACHILLES TENDON, INITIAL ENCOUNTER: Primary | ICD-10-CM

## 2022-03-23 DIAGNOSIS — Z98.890 POST-OPERATIVE STATE: ICD-10-CM

## 2022-03-23 PROCEDURE — 99213 OFFICE O/P EST LOW 20 MIN: CPT | Performed by: PODIATRIST

## 2022-03-23 NOTE — PROGRESS NOTES
Assessment/Plan:         Diagnoses and all orders for this visit:    Rupture of left Achilles tendon, initial encounter    Post-operative state      Patient was concerned about a small scab coming loose  It is stable and fine  Likely either a small suture spit or scab coming loose  No acute care    Continue therapy  His achilles is doing well  Recheck with Dr Marianne Canada in 2 weeks    Subjective:      Patient ID: Melissa Watt is a 46 y o  male  PAtient ruptured his left achilles and underwent repair last November  He had some clear fluid drain out a scab last week and he was worried  His PT is going well  The following portions of the patient's history were reviewed and updated as appropriate: allergies, current medications, past family history, past medical history, past social history, past surgical history and problem list     Review of Systems   Constitutional: Negative  Gastrointestinal: Negative for diarrhea, nausea and vomiting  Musculoskeletal: Negative for arthralgias, gait problem and myalgias  Skin: Negative for color change and wound  Neurological: Negative for weakness and numbness  Objective:      /79   Pulse 59   Ht 5' 9" (1 753 m) Comment: verbal  BMI 36 92 kg/m²          Physical Exam  Vitals reviewed  Constitutional:       Appearance: He is obese  He is not ill-appearing or diaphoretic  Cardiovascular:      Rate and Rhythm: Normal rate  Pulses: Normal pulses  Pulmonary:      Effort: Pulmonary effort is normal  No respiratory distress  Musculoskeletal:         General: No tenderness  Left ankle:      Left Achilles Tendon: No tenderness or defects (mild thickening of tendon but little pain  MMT is normal)  Mcneil's test negative  Skin:     Comments: Small scab posterior incision left ankle, dry  No sign of infection   Neurological:      Mental Status: He is alert

## 2022-03-24 ENCOUNTER — OFFICE VISIT (OUTPATIENT)
Dept: PHYSICAL THERAPY | Facility: REHABILITATION | Age: 52
End: 2022-03-24
Payer: OTHER MISCELLANEOUS

## 2022-03-24 DIAGNOSIS — M25.775 OSTEOPHYTE, LEFT FOOT: ICD-10-CM

## 2022-03-24 DIAGNOSIS — S86.012D RUPTURE OF LEFT ACHILLES TENDON, SUBSEQUENT ENCOUNTER: Primary | ICD-10-CM

## 2022-03-24 PROCEDURE — 97112 NEUROMUSCULAR REEDUCATION: CPT | Performed by: PHYSICAL THERAPIST

## 2022-03-24 NOTE — PROGRESS NOTES
Daily Note     Today's date: 3/24/2022  Patient name: Nusrat Epps  : 1970  MRN: [id number]  Referring provider: Radha Crawford DPM  Dx:   Encounter Diagnosis     ICD-10-CM    1  Rupture of left Achilles tendon, subsequent encounter  S86 012D    2  Osteophyte, left foot  M25 [id number]        Start Time: 1745  Stop Time: 1830  Total time in clinic (min): 45 minutes    Subjective: Patient reports he was on his feet a lot today and has some general soreness as a result      Objective: See treatment diary below      Assessment: Tolerated treatment well with good tolerance to forward and lateral step ups with improved control noted  Initiated waddle walks and monster walks with theraband resistance  Cueing required to complete sidestepping with slight knee flexion  Completed step downs with no risers secondary to difficulty controlling descent  Patient demonstrated fatigue post treatment and would benefit from continued PT  Plan: Continue per plan of care        Diagnosis: s/p L Achilles repair and bone graft 21   Precautions: avoid Achilles stretching, WBAT in CAM boot   Primary impairments: decreased ankle mobility/strength, gait dysfunction, and generalized L LE strength deficits   *asterisks by exercise = given for HEP    3/24 3/10 3/14 3/17 3/21   Manuals        Posterior talocrural mobilizations        Scar mobilization                                There Ex        Rec bike   L4 x 5'      Ankle DF/PF AROM *        Ankle inv/ev AROM *        Ankle circles *        Ankle alphabet *        Seated BAPS: DF/PF, inv/ev and circles                        Patient education     CM    Neuro Re-Ed        Retro amb on treadmill  1 5 mph x 6'  1 5 mph x 5'  1 5 mph x 5'  1 5 mph x 6'  1 5 mph x 6'   3-way ankle band *        Band ankle PF *        Ankle isometrics        Supine SLR *        S/L hip abd *        Prone hip ext *        Standing 3-way SLR *        Seated heel/toe raises   2 x 20   x 20    Mini squats   2 x 10      Seated PF isometric into towel     5" x 15    Standing heel/toe rocks        Standing heel raises  2 x 10  2 x 10    2 x 10   Standing toe raises  2 x 10  x 15  2 x 10   2 x 10   SLS on foam  10" x 5     10" x 5   Tandem amb  x 2 laps  x 2 laps  x 2 laps   x 2 laps   Retro tandem  x 2 laps  x 2 laps  x 2 laps   x 2 laps   Fwd step ups  1 riser x 15     1 riser x 15   Lat step ups  1 riser x 15     1 riser x 15   Fwd step down  0 risers x 10     no risers x 10   Sidestepping  red x 2 laps     red x 2 laps   Monster walks  red x 2 laps       Waddle walks  red x 2 laps               Re-evaluation    CM     Ther Act/Gait                          Modalities        CP prn                     HEP: COLT IQBAL [facility]

## 2022-03-28 ENCOUNTER — OFFICE VISIT (OUTPATIENT)
Dept: PHYSICAL THERAPY | Facility: REHABILITATION | Age: 52
End: 2022-03-28
Payer: OTHER MISCELLANEOUS

## 2022-03-28 DIAGNOSIS — M25.775 OSTEOPHYTE, LEFT FOOT: ICD-10-CM

## 2022-03-28 DIAGNOSIS — S86.012D RUPTURE OF LEFT ACHILLES TENDON, SUBSEQUENT ENCOUNTER: Primary | ICD-10-CM

## 2022-03-28 PROCEDURE — 97112 NEUROMUSCULAR REEDUCATION: CPT | Performed by: PHYSICAL THERAPIST

## 2022-03-28 NOTE — PROGRESS NOTES
Daily Note     Today's date: 3/28/2022  Patient name: Ruslan Sweet  : 1970  MRN: [id number]  Referring provider: Delbert Monroy DPM  Dx:   Encounter Diagnosis     ICD-10-CM    1  Rupture of left Achilles tendon, subsequent encounter  S86 012D    2  Osteophyte, left foot  M25 [id number]        Start Time: 1700  Stop Time: 1745  Total time in clinic (min): 45 minutes    Subjective: Patient reports he has been on his feet all day and he has a little tightness coming in after being in the car      Objective: See treatment diary below      Assessment: Tolerated treatment well including addition of leg press resisted heel raises bilateral and unilateral  Improving PF strength with heel raises  Difficulty controlling descent with forward step downs  Patient exhibited good technique with therapeutic exercises and would benefit from continued PT      Plan: Continue per plan of care        Diagnosis: s/p L Achilles repair and bone graft 21   Precautions: avoid Achilles stretching, WBAT in CAM boot   Primary impairments: decreased ankle mobility/strength, gait dysfunction, and generalized L LE strength deficits   *asterisks by exercise = given for HEP    3/24 3/28 3/14 3/17 3/21   Manuals        Posterior talocrural mobilizations        Scar mobilization                                There Ex        Rec bike        Ankle DF/PF AROM *        Ankle inv/ev AROM *        Ankle circles *        Ankle alphabet *        Seated BAPS: DF/PF, inv/ev and circles                        Patient education     CM    Neuro Re-Ed        Retro amb on treadmill  1 5 mph x 6'  1 7 mph x 6'  1 5 mph x 5'  1 5 mph x 6'  1 5 mph x 6'   3-way ankle band *        Band ankle PF *        Ankle isometrics        Supine SLR *        S/L hip abd *        Prone hip ext *        Standing 3-way SLR *        Seated heel/toe raises     x 20    Mini squats        Seated PF isometric into towel     5" x 15    Standing heel/toe rocks        Standing heel raises  2 x 10  2 x 10    2 x 10   Standing toe raises  2 x 10  2 x 10  2 x 10   2 x 10   Heel raises on leg press B/L   45 lbs x 15      Heel raises on leg press U/L   15 lbs x 15      SLS on foam  10" x 5  10" x 5    10" x 5   Tandem amb  x 2 laps   x 2 laps   x 2 laps   Retro tandem  x 2 laps   x 2 laps   x 2 laps   Fwd step ups  1 riser x 15  1 riser x 15    1 riser x 15   Lat step ups  1 riser x 15  1 riser x 15    1 riser x 15   Fwd step down  0 risers x 10  0 risers x 15    no risers x 10   Sidestepping  red x 2 laps  red x 2 laps    red x 2 laps   Monster walks  red x 2 laps  red x 2 laps      Waddle walks  red x 2 laps  red x 2 laps              Re-evaluation    CM     Ther Act/Gait                          Modalities        CP prn                     HEP: COLT IQBAL [facility]

## 2022-03-31 ENCOUNTER — OFFICE VISIT (OUTPATIENT)
Dept: PHYSICAL THERAPY | Facility: REHABILITATION | Age: 52
End: 2022-03-31
Payer: OTHER MISCELLANEOUS

## 2022-03-31 DIAGNOSIS — S86.012D RUPTURE OF LEFT ACHILLES TENDON, SUBSEQUENT ENCOUNTER: Primary | ICD-10-CM

## 2022-03-31 DIAGNOSIS — M25.775 OSTEOPHYTE, LEFT FOOT: ICD-10-CM

## 2022-03-31 DIAGNOSIS — Z98.890 POST-OPERATIVE STATE: ICD-10-CM

## 2022-03-31 PROCEDURE — 97112 NEUROMUSCULAR REEDUCATION: CPT | Performed by: PHYSICAL THERAPY ASSISTANT

## 2022-03-31 PROCEDURE — 97110 THERAPEUTIC EXERCISES: CPT | Performed by: PHYSICAL THERAPY ASSISTANT

## 2022-03-31 NOTE — PROGRESS NOTES
Daily Note     Today's date: 3/31/2022  Patient name: Ibeth Castaneda  : 1970  MRN: [id number]  Referring provider: Claire Gresham DPM  Dx:   Encounter Diagnosis     ICD-10-CM    1  Rupture of left Achilles tendon, subsequent encounter  S86 012D    2  Osteophyte, left foot  M25 775    3  Post-operative state  [id number]                   Subjective: Patient reports he has been on his feet all day and he has a little tightness coming in after being in the car      Objective: See treatment diary below      Assessment: Tolerated treatment well Able to progress TE as noted without increased pain  Mild difficulty controlling descent with lateral step downs with 1 riser  Patient exhibited good technique with therapeutic exercises and would benefit from continued PT      Plan: Continue per plan of care        Diagnosis: s/p L Achilles repair and bone graft 21   Precautions: avoid Achilles stretching, WBAT in CAM boot   Primary impairments: decreased ankle mobility/strength, gait dysfunction, and generalized L LE strength deficits   *asterisks by exercise = given for HEP    3/24 3/28 3/31     Manuals        Posterior talocrural mobilizations        Scar mobilization                                There Ex        Rec bike        Ankle DF/PF AROM *        Ankle inv/ev AROM *        Ankle circles *        Ankle alphabet *        Seated BAPS: DF/PF, inv/ev and circles                        Patient education        Neuro Re-Ed        Retro amb on treadmill  1 5 mph x 6'  1 7 mph x 6' 1 7 mph     3-way ankle band *        Band ankle PF *        Ankle isometrics        Supine SLR *        S/L hip abd *        Prone hip ext *        Standing 3-way SLR *        Seated heel/toe raises        Mini squats        Seated PF isometric into towel        Standing heel/toe rocks        Standing heel raises  2 x 10  2 x 10 2x10     Standing toe raises  2 x 10  2 x 10 2x10     Heel raises on leg press B/L   45 lbs x 15 45# 2x10     Heel raises on leg press U/L   15 lbs x 15 15# 2x10     SLS on foam  10" x 5  10" x 5 10"x5     Tandem amb  x 2 laps       Retro tandem  x 2 laps       Fwd step ups  1 riser x 15  1 riser x 15 1 riser x20     Lat step ups  1 riser x 15  1 riser x 15 1 riser x20     Fwd step down  0 risers x 10  0 risers x 15 0 risers x20     Sidestepping  red x 2 laps  red x 2 laps gtb x2 laps     Monster walks  red x 2 laps  red x 2 laps gtb x2 laps     Waddle walks  red x 2 laps  red x 2 laps gtb x2 laps     Lat tap down   1 riser x10     Re-evaluation        Ther Act/Gait                          Modalities        CP prn                     HEP: COLT [facility]AR[facility]

## 2022-04-04 ENCOUNTER — OFFICE VISIT (OUTPATIENT)
Dept: PHYSICAL THERAPY | Facility: REHABILITATION | Age: 52
End: 2022-04-04
Payer: OTHER MISCELLANEOUS

## 2022-04-04 DIAGNOSIS — S86.012D RUPTURE OF LEFT ACHILLES TENDON, SUBSEQUENT ENCOUNTER: Primary | ICD-10-CM

## 2022-04-04 DIAGNOSIS — M25.775 OSTEOPHYTE, LEFT FOOT: ICD-10-CM

## 2022-04-04 PROCEDURE — 97112 NEUROMUSCULAR REEDUCATION: CPT | Performed by: PHYSICAL THERAPIST

## 2022-04-04 NOTE — PROGRESS NOTES
Daily Note     Today's date: 2022  Patient name: Tanika Calderon  : 1970  MRN: [id number]  Referring provider: Nathalie Najjar, DPM  Dx:   Encounter Diagnosis     ICD-10-CM    1  Rupture of left Achilles tendon, subsequent encounter  S86 012D    2  Osteophyte, left foot  M25 [id number]        Start Time: 1745  Stop Time: 1830  Total time in clinic (min): 45 minutes    Subjective: Patient reports he felt good after last treatment      Objective: See treatment diary below      Assessment: Tolerated treatment well with improving eccentric control noted during step downs  Increased step height with forward step downs  Difficulty maintaining transverse plane stability of knee during lateral test the perez  Patient exhibited good technique with therapeutic exercises and would benefit from continued PT      Plan: Continue per plan of care        Diagnosis: s/p L Achilles repair and bone graft 21   Precautions: avoid Achilles stretching, WBAT in CAM boot   Primary impairments: decreased ankle mobility/strength, gait dysfunction, and generalized L LE strength deficits   *asterisks by exercise = given for HEP    3/24 3/28 3/31 4/4    Manuals        Posterior talocrural mobilizations        Scar mobilization                                There Ex        Rec bike        Ankle DF/PF AROM *        Ankle inv/ev AROM *        Ankle circles *        Ankle alphabet *        Seated BAPS: DF/PF, inv/ev and circles                        Patient education        Neuro Re-Ed        Retro amb on treadmill  1 5 mph x 6'  1 7 mph x 6' 1 7 mph  2 mph x 6'    3-way ankle band *        Band ankle PF *        Ankle isometrics        Supine SLR *        S/L hip abd *        Prone hip ext *        Standing 3-way SLR *        Standing heel/toe rocks        Standing heel raises  2 x 10  2 x 10 2x10  3 x 10    Standing toe raises  2 x 10  2 x 10 2x10  3 x 10    Heel raises on leg press B/L   45 lbs x 15 45# 2x10  55 lbs 2 x 10    Heel raises on leg press U/L   15 lbs x 15 15# 2x10  25 lbs 3 x 10    SLS on foam  10" x 5  10" x 5 10"x5     Tandem amb  x 2 laps       Retro tandem  x 2 laps       Fwd step ups  1 riser x 15  1 riser x 15 1 riser x20  1R x 20    Lat step ups  1 riser x 15  1 riser x 15 1 riser x20  1R x 20    Fwd step down  0 risers x 10  0 risers x 15 0 risers x20  1R x 20    Lat test the water     1R x 10    Sidestepping  red x 2 laps  red x 2 laps gtb x2 laps  green x 2 laps    Monster walks  red x 2 laps  red x 2 laps gtb x2 laps  green x 2 laps    Waddle walks  red x 2 laps  red x 2 laps gtb x2 laps  green x 2 laps                                                    Re-evaluation        Ther Act/Gait                          Modalities        CP prn                     HEP: COLT COHN FARIDA [facility]

## 2022-04-07 ENCOUNTER — OFFICE VISIT (OUTPATIENT)
Dept: PHYSICAL THERAPY | Facility: REHABILITATION | Age: 52
End: 2022-04-07
Payer: OTHER MISCELLANEOUS

## 2022-04-07 DIAGNOSIS — S86.012D RUPTURE OF LEFT ACHILLES TENDON, SUBSEQUENT ENCOUNTER: Primary | ICD-10-CM

## 2022-04-07 DIAGNOSIS — M25.775 OSTEOPHYTE, LEFT FOOT: ICD-10-CM

## 2022-04-07 PROCEDURE — 97112 NEUROMUSCULAR REEDUCATION: CPT | Performed by: PHYSICAL THERAPIST

## 2022-04-07 NOTE — PROGRESS NOTES
Daily Note     Today's date: 2022  Patient name: Alf Solano  : 1970  MRN: [id number]  Referring provider: Candido Jensen DPM  Dx:   Encounter Diagnosis     ICD-10-CM    1  Rupture of left Achilles tendon, subsequent encounter  S86 012D    2  Osteophyte, left foot  M25 775        Start Time: 1710  Stop Time: 1745  Total time in clinic (min): 35 minutes    Subjective: Patient reports he felt good after last visit with minimal pain aggravation      Objective: See treatment diary below      Assessment: Tolerated treatment well with slight limitation in treatment time secondary to traffic  Improving eccentric control noted during step downs and lateral test the perez  Patient tolerated increased resistance with bilateral and unilateral heel raises well  Patient exhibited good technique with therapeutic exercises and would benefit from continued PT  Plan to progress dynamic balance exercises as appropriate      Plan: Continue per plan of care        Diagnosis: s/p L Achilles repair and bone graft 21   Precautions: avoid Achilles stretching, WBAT in CAM boot   Primary impairments: decreased ankle mobility/strength, gait dysfunction, and generalized L LE strength deficits   *asterisks by exercise = given for HEP    3/24 3/28 3/31 4/4 4/7   Manuals        Posterior talocrural mobilizations        Scar mobilization                                There Ex        Rec bike        Ankle DF/PF AROM *        Ankle inv/ev AROM *        Ankle circles *        Ankle alphabet *        Seated BAPS: DF/PF, inv/ev and circles                        Patient education        Neuro Re-Ed        Retro amb on treadmill  1 5 mph x 6'  1 7 mph x 6' 1 7 mph  2 mph x 6'  2 mph x 6'   3-way ankle band *        Band ankle PF *        Ankle isometrics        Supine SLR *        S/L hip abd *        Prone hip ext *        Standing 3-way SLR *        Standing heel/toe rocks        Standing heel raises  2 x 10  2 x 10 2x10  3 x 10  3 x 10   Standing toe raises  2 x 10  2 x 10 2x10  3 x 10  3 x 10   Heel raises on leg press B/L   45 lbs x 15 45# 2x10  55 lbs 2 x 10  65 lbs 3 x 10   Heel raises on leg press U/L   15 lbs x 15 15# 2x10  25 lbs 3 x 10  35 lbs 3 x 10   U/L leg press        SLS on foam  10" x 5  10" x 5 10"x5     Tandem amb  x 2 laps       Retro tandem  x 2 laps       Fwd step ups  1 riser x 15  1 riser x 15 1 riser x20  1R x 20  1R x 20   Lat step ups  1 riser x 15  1 riser x 15 1 riser x20  1R x 20  1R x 20   Fwd step down  0 risers x 10  0 risers x 15 0 risers x20  1R x 20  1R x 20   Lat test the water     1R x 10  1R x 10   Sidestepping  red x 2 laps  red x 2 laps gtb x2 laps  green x 2 laps  green x 2 laps   Monster walks  red x 2 laps  red x 2 laps gtb x2 laps  green x 2 laps  green x 2 laps   Waddle walks  red x 2 laps  red x 2 laps gtb x2 laps  green x 2 laps  green x 2 laps   U/L heel raises        U/L squat to chair        SLS ball toss on foam                                        Re-evaluation        Ther Act/Gait                          Modalities        CP prn                     HEP: COLT COHN FARIDA [facility]

## 2022-04-11 ENCOUNTER — OFFICE VISIT (OUTPATIENT)
Dept: PHYSICAL THERAPY | Facility: REHABILITATION | Age: 52
End: 2022-04-11
Payer: OTHER MISCELLANEOUS

## 2022-04-11 DIAGNOSIS — M25.775 OSTEOPHYTE, LEFT FOOT: ICD-10-CM

## 2022-04-11 DIAGNOSIS — S86.012D RUPTURE OF LEFT ACHILLES TENDON, SUBSEQUENT ENCOUNTER: Primary | ICD-10-CM

## 2022-04-11 PROCEDURE — 97112 NEUROMUSCULAR REEDUCATION: CPT | Performed by: PHYSICAL THERAPIST

## 2022-04-11 NOTE — PROGRESS NOTES
Daily Note     Today's date: 2022  Patient name: Daniel Lowry  : 1970  MRN: [id number]  Referring provider: Roseanne Ewing DPM  Dx:   Encounter Diagnosis     ICD-10-CM    1  Rupture of left Achilles tendon, subsequent encounter  S86 012D    2  Osteophyte, left foot  M25 [id number]        Start Time: 1700  Stop Time: 1745  Total time in clinic (min): 45 minutes    Subjective: Patient reports he had some pain aggravation in the posterior heel with no mechanism of injury however this may have been related to increased workload in therapy and the gym as he did the elliptical and walked on uneven ground a lot over the weekend      Objective: See treatment diary below      Assessment: Progressions deferred due to increased soreness on arrival  Completed step exercises at lower height to offload tendon  No palpable tendon defect noted and no pain with closed chain heel raises or resisted plantarflexion  Plan to reassess next visit and initiate work simulation including pushing/pulling and lifting as tolerated and appropriate  Patient would benefit from continued PT      Plan: Continue per plan of care        Diagnosis: s/p L Achilles repair and bone graft 21   Precautions: avoid Achilles stretching, WBAT in CAM boot   Primary impairments: decreased ankle mobility/strength, gait dysfunction, and generalized L LE strength deficits   *asterisks by exercise = given for HEP    4/11 3/28 3/31 4/4 4/7   Manuals        Posterior talocrural mobilizations        Scar mobilization                                There Ex        Rec bike        Ankle DF/PF AROM *        Ankle inv/ev AROM *        Ankle circles *        Ankle alphabet *        Seated BAPS: DF/PF, inv/ev and circles                        Patient education        Neuro Re-Ed        Retro amb on treadmill  1 8 mph x 6'  1 7 mph x 6' 1 7 mph  2 mph x 6'  2 mph x 6'   3-way ankle band *        Band ankle PF *        Ankle isometrics        Supine SLR *        S/L hip abd *        Prone hip ext *        Standing 3-way SLR *        Standing heel/toe rocks        Standing heel raises  3 x 10  2 x 10 2x10  3 x 10  3 x 10   Standing toe raises  3 x 10  2 x 10 2x10  3 x 10  3 x 10   Heel raises on leg press B/L  55 lbs 2 x 10  45 lbs x 15 45# 2x10  55 lbs 2 x 10  65 lbs 3 x 10   Heel raises on leg press U/L  25 lbs 2 x 10  15 lbs x 15 15# 2x10  25 lbs 3 x 10  35 lbs 3 x 10   U/L leg press        SLS on foam   10" x 5 10"x5     Tandem amb        Retro tandem        Fwd step ups  0R x 20  1 riser x 15 1 riser x20  1R x 20  1R x 20   Lat step ups  0R x 20  1 riser x 15 1 riser x20  1R x 20  1R x 20   Fwd step down  0R x 20  0 risers x 15 0 risers x20  1R x 20  1R x 20   Lat test the water  NP    1R x 10  1R x 10   Sidestepping  green x 2 laps  red x 2 laps gtb x2 laps  green x 2 laps  green x 2 laps   Monster walks  green x 2 laps  red x 2 laps gtb x2 laps  green x 2 laps  green x 2 laps   Waddle walks  green x 2 laps  red x 2 laps gtb x2 laps  green x 2 laps  green x 2 laps   U/L heel raises        U/L squat to chair        SLS ball toss on foam                                        Re-evaluation        Ther Act/Gait        Pushing/pulling                                 Modalities        CP prn                     HEP: COLT IQBAL [facility]

## 2022-04-12 ENCOUNTER — OFFICE VISIT (OUTPATIENT)
Dept: PODIATRY | Facility: CLINIC | Age: 52
End: 2022-04-12
Payer: OTHER MISCELLANEOUS

## 2022-04-12 VITALS
HEIGHT: 69 IN | DIASTOLIC BLOOD PRESSURE: 98 MMHG | WEIGHT: 250 LBS | BODY MASS INDEX: 37.03 KG/M2 | SYSTOLIC BLOOD PRESSURE: 138 MMHG

## 2022-04-12 DIAGNOSIS — M25.775 OSTEOPHYTE, LEFT FOOT: ICD-10-CM

## 2022-04-12 DIAGNOSIS — S86.012A RUPTURE OF LEFT ACHILLES TENDON, INITIAL ENCOUNTER: Primary | ICD-10-CM

## 2022-04-12 PROCEDURE — 99213 OFFICE O/P EST LOW 20 MIN: CPT | Performed by: PODIATRIST

## 2022-04-12 NOTE — PROGRESS NOTES
PATIENT:  Maryanne Lopez      1970      ASSESSMENT     1  Rupture of left Achilles tendon, initial encounter     2  Osteophyte, left foot          PLAN  Reviewed the note from Dr Felisha Reyes  No wound presents at this time  Reviewed PT notes  Will do work simulation exercise at [facility]  Advance shoes as tolerated  Ankle support as needed  Continue modified work duty / sedentary work  Will increase work duty to [employer] duty and 50% regular duty  RA in 6 weeks  HISTORY OF PRESENT ILLNESS  Patient presents for follow-up  He is doing well with decreased pain  Tolerates WB well with regular shoes  Doing well at PT  Tolerates sedentary duty at work            PAST MEDICAL HISTORY:  Past Medical History:   Diagnosis Date    Borderline diabetes     Fractures     Heart murmur of      History of chest pain     , "had full cardio workup LVH/all good thought related to work stress"    History of Graves' disease 2021    "had radioactive iodine"    Hyperlipidemia     Hypertension     Left ankle pain     Moderate exercise     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    Obesity (BMI 35 0-39 9 without comorbidity)     Teeth missing     Wears glasses        PAST SURGICAL HISTORY:  Past Surgical History:   Procedure Laterality Date    APPENDECTOMY      BONE EXOSTOSIS EXCISION Left 2021    Procedure: EXCISION EXOSTOSIS HEEL;  Surgeon: Angelina Ramachandran DPM;  Location: [facility];  Service: Podiatry    CARDIAC CATHETERIZATION      approx  or so, pt reports "no blockages"    COLONOSCOPY      HAND SURGERY      beebee pellet from hand removed        ALLERGIES:  Bee venom    MEDICATIONS:  Current Outpatient Medications   Medication Sig Dispense Refill    acetaminophen (TYLENOL) 500 mg tablet Take 500 mg by mouth every 6 (six) hours as needed for mild pain      amLODIPine (NORVASC) 5 mg tablet Take 5 mg by mouth      aspirin (ECOTRIN LOW STRENGTH) 81 mg EC tablet Take by mouth      atorvastatin (LIPITOR) 40 mg tablet       Diclofenac Sodium (VOLTAREN) 1 % Apply 2 g topically 4 (four) times a day 150 g 2    EPINEPHrine (EPIPEN) 0 3 mg/0 3 mL SOAJ Inject 0 3 mg into a muscle      fluticasone (FLONASE) 50 mcg/act nasal spray 1-2 sprays into each nostril as needed        hydrochlorothiazide (HYDRODIURIL) 25 mg tablet Take 25 mg by mouth daily      levalbuterol (XOPENEX HFA) 45 mcg/act inhaler Inhale as needed        levothyroxine 137 mcg tablet       loratadine (CLARITIN) 10 mg tablet Take 10 mg by mouth daily      metFORMIN (GLUCOPHAGE-XR) 500 mg 24 hr tablet Take 500 mg by mouth daily with breakfast        naproxen (NAPROSYN) 500 mg tablet as needed        omeprazole (PriLOSEC) 20 mg delayed release capsule       oxyCODONE-acetaminophen (Percocet) 5-325 mg per tablet Take 1 tablet by mouth every 6 (six) hours as needed for severe pain for up to 20 doses Max Daily Amount: 4 tablets 20 tablet 0    propranolol (INDERAL) 20 mg tablet TAKE 1 TABLET BY MOUTH TWICE A DAY      terbinafine (LamISIL) 250 mg tablet Take 250 mg by mouth daily        apixaban (Eliquis) 2 5 mg Take 1 tablet (2 5 mg total) by mouth 2 (two) times a day 60 tablet 0    chlorhexidine (PERIDEX) 0 12 % solution  (Patient not taking: Reported on 11/5/2021)      [facility] Aspirin EC 81 MG EC tablet  (Patient not taking: Reported on 1/6/2022 )      ibuprofen (MOTRIN) 600 mg tablet  (Patient not taking: Reported on 11/5/2021)       No current facility-administered medications for this visit         SOCIAL HISTORY:  Social History     Socioeconomic History    Marital status: Single     Spouse name: None    Number of children: None    Years of education: None    Highest education level: None   Occupational History    None   Tobacco Use    Smoking status: Never Smoker    Smokeless tobacco: Never Used   Vaping Use    Vaping Use: Never used   Substance and Sexual Activity    Alcohol use: Not Currently  Drug use: Not Currently    Sexual activity: None     Comment: defer   Other Topics Concern    None   Social History Narrative    None     Social Determinants of Health     Financial Resource Strain: Not on file   Food Insecurity: Not on file   Transportation Needs: Not on file   Physical Activity: Not on file   Stress: Not on file   Social Connections: Not on file   Intimate Partner Violence: Not on file   Housing Stability: Not on file        REVIEW OF SYSTEMS  GENERAL: No fever or chills  HEART: No chest pain, or palpitation  RESPIRATORY:  No SOB or cough  GI: No Nausea, vomit or diarrhea  NEUROLOGIC: No syncope or acute weakness  MUSCULOSKELETAL: No calf pain or edema  PHYSICAL EXAMINATION  GENERAL  The patient appears in NAD / non-toxic  Afebrile  VSS    VASCULAR EXAM  Pedal pulses and vascular status are intact  No calf pain or edema bilaterally  No cyanosis  DERMATOLOGIC EXAM  No wound  No signs of infection  No drainage  No necrosis or dehiscence  Minimal edema LLE  NEUROLOGIC EXAM  AAO X 3  No focal neurologic deficit  Neurologic status is intact BLE  MUSCULOSKELETAL EXAM  Good surgical correction  Achilles tendon intact left  Improved plantarflexion left foot  Negative Mcneil sign  Improved left ankle ROM  No fluctuation or crepitus

## 2022-04-14 ENCOUNTER — EVALUATION (OUTPATIENT)
Dept: PHYSICAL THERAPY | Facility: REHABILITATION | Age: 52
End: 2022-04-14
Payer: OTHER MISCELLANEOUS

## 2022-04-14 DIAGNOSIS — S86.012D RUPTURE OF LEFT ACHILLES TENDON, SUBSEQUENT ENCOUNTER: Primary | ICD-10-CM

## 2022-04-14 DIAGNOSIS — M25.775 OSTEOPHYTE, LEFT FOOT: ICD-10-CM

## 2022-04-14 PROCEDURE — 97112 NEUROMUSCULAR REEDUCATION: CPT | Performed by: PHYSICAL THERAPIST

## 2022-04-14 NOTE — PROGRESS NOTES
PT Re-Evaluation     Today's date: 2022  Patient name: Daniel Lowry  : 1970  MRN: [id number]  Referring provider: Roseanne Ewing DPM  Dx:   Encounter Diagnosis     ICD-10-CM    1  Rupture of left Achilles tendon, subsequent encounter  S86 012D    2  Osteophyte, left foot  [id number]        Start Time: 1728   Stop Time: 1745  Total time in clinic (min): 17 minutes    Assessment  Assessment details: Patient is a 46 y o  male presenting s/p L Achilles repair and bone graft with date of surgery 21  Patient exhibits good progress toward objective and functional goals at time of reexamination  Patient exhibits improvements with gait, static and dynamic balance, tolerance to standing/ambulation, and stair navigation since initiating PT however continues to have limitations compared to prior level of function  Remaining functional limitations include inability to return to unrestricted work, difficulty with lifting, and difficulty with pushing/pulling  Continued PF strength deficits noted involved side compared to uninvolved  Patient achieved FOTO score of 69 indicating a 13 point improvement since prior assessment  As a result of impairments patient has continued limitations with daily and functional activities and would benefit from continued skilled PT interventions to address these impairments in order to maximize function  Treatment time limited due to patient arrived late coming from work          Impairments: abnormal muscle firing, abnormal or restricted ROM, abnormal movement, activity intolerance, impaired physical strength and pain with function     Prognosis: good    Goals  Impairment Goals: 4-6 weeks  - Patient to decrease pain to 2/10 - MET  - Patient to improve ankle AROM to [facility] all planes - MET  - Patient to improve L hip/knee strength to 4+/5 throughout - MET    Functional Goals: by discharge  - Patient to discharge to independent [facility] - PROGRESSING  - Patient to increase FOTO to 72 - PROGRESSING  - Patient to return to work without limitations - PROGRESSING  - Patient to improve L ankle strength to 4+/5 - PROGRESSING  - Patient to lift 70 lbs  floor to waist without compensation/limitations - PROGRESSING    Plan  Patient would benefit from: skilled physical therapy  Planned modality interventions: cryotherapy, TENS and thermotherapy: hydrocollator packs  Planned therapy interventions: flexibility, home exercise program, joint mobilization, manual therapy, neuromuscular re-education, patient education, strengthening, stretching, therapeutic activities, therapeutic exercise and functional ROM exercises  Frequency: 2x week  Duration in weeks: 4  Treatment plan discussed with: patient        Subjective Evaluation    History of Present Illness  Mechanism of injury: HISTORY OF PRESENT ILLNESS: Patient is s/p L Achilles repair with bone graft 21  Patient has been doing well with normal footwear for the past 5 weeks  Patient no longer has limitations with daily activities  He continues to have some soreness after being on his feet and working for a while  Patient has been essentially doing his normal job at work with exception of stair climbing     PRIOR TREATMENT: surgery as detailed above  AGGRAVATING FACTORS: N/A  EASING FACTORS: N/A  WORK:  (description in chart)  IMAGING: x-rays and MRI preoperatively  FUNCTIONAL LIMITATIONS: light duty work, pushing/pulling, lifting and carrying  IMPROVEMENTS: ankle mobility, decreased incisional sensitivity, wearing normal footwear full time, tolerance to standing/ambulation, stair navigation  SUBJECTIVE FUNCTIONAL LEVEL: 80% improvement with PT  PATIENT GOAL: just to heal and get back to work    Pain  Current pain ratin  At best pain ratin  At worst pain ratin  Location: L Achilles          Objective     Active Range of Motion   Left Ankle/Foot   Dorsiflexion (ke): 12 degrees   Plantar flexion: WFL  Inversion: 27 degrees   Eversion: 30 degrees     Right Ankle/Foot   Dorsiflexion (ke): 10 degrees   Plantar flexion: WFL  Inversion: WFL  Eversion: WFL    Strength/Myotome Testing     Lumbar     Right   Normal strength    Left Hip   Planes of Motion   Flexion: 5  External rotation: 5  Internal rotation: 5    Left Knee   Flexion: 5  Extension: 5    Left Ankle/Foot   Dorsiflexion: 5  Inversion: 5  Eversion: 5    Right Ankle/Foot   Normal strength    Additional Strength Details  Single leg heel raises:  R: 20  L: 1    Ambulation     Observational Gait     Additional Observational Gait Details  No significant deviations      Flowsheet Rows      Most Recent Value   PT/OT G-Codes    Current Score 72   Projected Score 61              Diagnosis: s/p L Achilles repair and bone graft 11/23/21   Precautions: avoid Achilles stretching, WBAT in CAM boot   Primary impairments: decreased ankle mobility/strength, gait dysfunction, and generalized L LE strength deficits   *asterisks by exercise = given for HEP    4/11 4/14 3/31 4/4 4/7   Manuals        Posterior talocrural mobilizations        Scar mobilization                                There Ex        Rec bike        Ankle DF/PF AROM *        Ankle inv/ev AROM *        Ankle circles *        Ankle alphabet *        Seated BAPS: DF/PF, inv/ev and circles                        Patient education        Neuro Re-Ed        Retro amb on treadmill  1 8 mph x 6'  1 7 mph  2 mph x 6'  2 mph x 6'   3-way ankle band *        Band ankle PF *        Ankle isometrics        Supine SLR *        S/L hip abd *        Prone hip ext *        Standing 3-way SLR *        Standing heel/toe rocks        Standing heel raises  3 x 10  2x10  3 x 10  3 x 10   Standing toe raises  3 x 10  2x10  3 x 10  3 x 10   Heel raises on leg press B/L  55 lbs 2 x 10  45# 2x10  55 lbs 2 x 10  65 lbs 3 x 10   Heel raises on leg press U/L  25 lbs 2 x 10  15# 2x10  25 lbs 3 x 10  35 lbs 3 x 10   U/L leg press        SLS on foam   10"x5     Tandem amb Retro tandem        Fwd step ups  0R x 20  1 riser x20  1R x 20  1R x 20   Lat step ups  0R x 20  1 riser x20  1R x 20  1R x 20   Fwd step down  0R x 20  0 risers x20  1R x 20  1R x 20   Lat test the water  NP    1R x 10  1R x 10   Sidestepping  green x 2 laps  gtb x2 laps  green x 2 laps  green x 2 laps   Monster walks  green x 2 laps  gtb x2 laps  green x 2 laps  green x 2 laps   Waddle walks  green x 2 laps  gtb x2 laps  green x 2 laps  green x 2 laps   U/L heel raises        U/L squat to chair        SLS ball toss on foam                                        Re-evaluation   CM      Ther Act/Gait        Pushing/pulling                                 Modalities        CP prn                     HEP: [facility]

## 2022-04-15 ENCOUNTER — TELEPHONE (OUTPATIENT)
Dept: PODIATRY | Facility: CLINIC | Age: 52
End: 2022-04-15

## 2022-04-18 ENCOUNTER — OFFICE VISIT (OUTPATIENT)
Dept: PHYSICAL THERAPY | Facility: REHABILITATION | Age: 52
End: 2022-04-18
Payer: OTHER MISCELLANEOUS

## 2022-04-18 DIAGNOSIS — S86.012D RUPTURE OF LEFT ACHILLES TENDON, SUBSEQUENT ENCOUNTER: Primary | ICD-10-CM

## 2022-04-18 DIAGNOSIS — M25.775 OSTEOPHYTE, LEFT FOOT: ICD-10-CM

## 2022-04-18 PROCEDURE — 97112 NEUROMUSCULAR REEDUCATION: CPT | Performed by: PHYSICAL THERAPIST

## 2022-04-18 NOTE — PROGRESS NOTES
Daily Note     Today's date: 2022  Patient name: Niharika Florence  : 1970  MRN: [id number]  Referring provider: Iveth Sauceda DPM  Dx:   Encounter Diagnosis     ICD-10-CM    1  Rupture of left Achilles tendon, subsequent encounter  S86 012D    2  Osteophyte, left foot  [id number] [id number]        Start Time: 1700  Stop Time: 1745  Total time in clinic (min): 45 minutes    Subjective: Patient reports he had a lighter weekend with respect to activity  He was placed on work restrictions allowing lifting up to 20 lbs  Objective: See treatment diary below      Assessment: Tolerated treatment well with very good exercise recall  Initiated unilateral squat to chair and single leg stance on floor with ball toss which challenged patient  Attempted single leg stance on foam with ball toss however unable to complete secondary to difficulty maintaining balance  Patient exhibited good technique with therapeutic exercises and would benefit from continued PT      Plan: Continue per plan of care        Diagnosis: s/p L Achilles repair and bone graft 21   Precautions: avoid Achilles stretching, WBAT in CAM boot   Primary impairments: decreased ankle mobility/strength, gait dysfunction, and generalized L LE strength deficits   *asterisks by exercise = given for HEP       Manuals        Posterior talocrural mobilizations        Scar mobilization                                There Ex        Rec bike        Ankle DF/PF AROM *        Ankle inv/ev AROM *        Ankle circles *        Ankle alphabet *        Seated BAPS: DF/PF, inv/ev and circles                        Patient education        Neuro Re-Ed        Retro amb on treadmill  1 8 mph x 6'   1 8 mph x 6'  2 mph x 6'  2 mph x 6'   3-way ankle band *        Band ankle PF *        Ankle isometrics        Supine SLR *        S/L hip abd *        Prone hip ext *        Standing 3-way SLR *        Standing heel/toe rocks        Standing heel raises  3 x 10 3 x 10  3 x 10   Standing toe raises  3 x 10    3 x 10  3 x 10   Heel raises on leg press B/L  55 lbs 2 x 10   65 lbs 3 x 10  55 lbs 2 x 10  65 lbs 3 x 10   Heel raises on leg press U/L  25 lbs 2 x 10   35 lbs 3 x 10  25 lbs 3 x 10  35 lbs 3 x 10   U/L leg press        SLS on foam        Tandem amb        Retro tandem        Fwd step ups  0R x 20   1R x 20  1R x 20  1R x 20   Lat step ups  0R x 20   1R x 20  1R x 20  1R x 20   Fwd step down  0R x 20   1R x 20  1R x 20  1R x 20   Lat test the water  NP   1R x 15  1R x 10  1R x 10   Sidestepping  green x 2 laps   green x 2 laps  green x 2 laps  green x 2 laps   Monster walks  green x 2 laps   green x 2 laps  green x 2 laps  green x 2 laps   Waddle walks  green x 2 laps   green x 2 laps  green x 2 laps  green x 2 laps   U/L heel raises    NV     U/L squat to chair    2 x 5 B     SLS ball toss    x 3 to fatigue                                     Re-evaluation   CM      Ther Act/Gait        Pushing/pulling                                 Modalities        CP prn                     HEP: COLT IQ[facility]

## 2022-04-21 ENCOUNTER — OFFICE VISIT (OUTPATIENT)
Dept: PHYSICAL THERAPY | Facility: REHABILITATION | Age: 52
End: 2022-04-21
Payer: OTHER MISCELLANEOUS

## 2022-04-21 DIAGNOSIS — M25.775 OSTEOPHYTE, LEFT FOOT: ICD-10-CM

## 2022-04-21 DIAGNOSIS — S86.012D RUPTURE OF LEFT ACHILLES TENDON, SUBSEQUENT ENCOUNTER: Primary | ICD-10-CM

## 2022-04-21 PROCEDURE — 97112 NEUROMUSCULAR REEDUCATION: CPT | Performed by: PHYSICAL THERAPIST

## 2022-04-21 PROCEDURE — 97530 THERAPEUTIC ACTIVITIES: CPT | Performed by: PHYSICAL THERAPIST

## 2022-04-21 NOTE — PROGRESS NOTES
Daily Note     Today's date: 2022  Patient name: Irma Branch  : 1970  MRN: [id number]  Referring provider: Saji Hernandez DPM  Dx:   Encounter Diagnosis     ICD-10-CM    1  Rupture of left Achilles tendon, subsequent encounter  S86 012D    2  Osteophyte, left foot  [id number] [id number]        Start Time: 1700  Stop Time: 1743  Total time in clinic (min): 43 minutes    Subjective: Patient reports he feels good on arrival and denies any significant soreness after last visit      Objective: See treatment diary below       Assessment: Tolerated treatment well including addition of pushing and pulling with mini trampoline and medicine balls for resistance  No gait deviations noted and patient no longer has difficulty with discomfort when switching footwear  Improving eccentric control with unilateral squats to chair  Patient demonstrated fatigue post treatment and would benefit from continued PT      Plan: Continue per plan of care        Diagnosis: s/p L Achilles repair and bone graft 21   Precautions: avoid Achilles stretching, WBAT in CAM boot   Primary impairments: decreased ankle mobility/strength, gait dysfunction, and generalized L LE strength deficits   *asterisks by exercise = given for HEP       Manuals        Posterior talocrural mobilizations        Scar mobilization                                There Ex        Rec bike        Ankle DF/PF AROM *        Ankle inv/ev AROM *        Ankle circles *        Ankle alphabet *        Seated BAPS: DF/PF, inv/ev and circles                        Patient education        Neuro Re-Ed        Retro amb on treadmill  1 8 mph x 6'   1 8 mph x 6'  2 mph x 6'  2 mph x 6'   3-way ankle band *        Band ankle PF *        Ankle isometrics        Supine SLR *        S/L hip abd *        Prone hip ext *        Standing 3-way SLR *        Standing heel/toe rocks        Standing heel raises  3 x 10     3 x 10   Standing toe raises  3 x 10     3 x 10 Heel raises on leg press B/L  55 lbs 2 x 10   65 lbs 3 x 10  65 lbs 3 x 10  65 lbs 3 x 10   Heel raises on leg press U/L  25 lbs 2 x 10   35 lbs 3 x 10  35 lbs 3 x 10  35 lbs 3 x 10   U/L leg press        SLS on foam        Tandem amb        Retro tandem        Fwd step up and over     1R x 20    Lat step ups  0R x 20   1R x 20  1R x 20  1R x 20   Lat test the water  NP   1R x 15  NP  1R x 10   Sidestepping  green x 2 laps   green x 2 laps   green x 2 laps   Monster walks  green x 2 laps   green x 2 laps   green x 2 laps   Waddle walks  green x 2 laps   green x 2 laps   green x 2 laps   U/L heel raises    NV     U/L squat to chair    2 x 5 B  2 x 5    SLS ball toss    x 3 to fatigue  x 3 to fatigue                                    Re-evaluation   CM      Ther Act/Gait        Pushing/pulling     x 10 laps                             Modalities        CP prn                     HEP: COLT IQBAL [facility]

## 2022-04-25 ENCOUNTER — OFFICE VISIT (OUTPATIENT)
Dept: PHYSICAL THERAPY | Facility: REHABILITATION | Age: 52
End: 2022-04-25
Payer: OTHER MISCELLANEOUS

## 2022-04-25 DIAGNOSIS — Z98.890 POST-OPERATIVE STATE: ICD-10-CM

## 2022-04-25 DIAGNOSIS — S86.012D RUPTURE OF LEFT ACHILLES TENDON, SUBSEQUENT ENCOUNTER: Primary | ICD-10-CM

## 2022-04-25 DIAGNOSIS — M25.775 OSTEOPHYTE, LEFT FOOT: ICD-10-CM

## 2022-04-25 PROCEDURE — 97530 THERAPEUTIC ACTIVITIES: CPT | Performed by: PHYSICAL THERAPIST

## 2022-04-25 PROCEDURE — 97112 NEUROMUSCULAR REEDUCATION: CPT | Performed by: PHYSICAL THERAPIST

## 2022-04-25 NOTE — PROGRESS NOTES
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  Still not taking heavy equipment up the stairs at work  Objective: See treatment diary below       Assessment: Tolerated treatment well  Pt reported soreness in achilles tendon at beginning of session which decreased with exercises through out  Added balance on bosu today to further address ankle stability, well tolerated  Also able to tolerate progressions in step height today with no pain  Patient demonstrated fatigue post treatment and would benefit from continued PT      Plan: Continue per plan of care        Diagnosis: s/p L Achilles repair and bone graft 21   Precautions: avoid Achilles stretching, WBAT in CAM boot   Primary impairments: decreased ankle mobility/strength, gait dysfunction, and generalized L LE strength deficits   *asterisks by exercise = given for HEP       Manuals        Posterior talocrural mobilizations        Scar mobilization                                There Ex        Rec bike        Ankle DF/PF AROM *        Ankle inv/ev AROM *        Ankle circles *        Ankle alphabet *        Seated BAPS: DF/PF, inv/ev and circles                        Patient education        Neuro Re-Ed        Retro amb on treadmill  1 8 mph x 6'   1 8 mph x 6'  2 mph x 6' 2 mph x 6'   3-way ankle band *        Band ankle PF *        Ankle isometrics        Supine SLR *        S/L hip abd *        Prone hip ext *        Standing 3-way SLR *        Standing heel/toe rocks        Standing heel raises  3 x 10       Standing toe raises  3 x 10       Heel raises on leg press B/L  55 lbs 2 x 10   65 lbs 3 x 10  65 lbs 3 x 10 70 lbs 3 x 10   Heel raises on leg press U/L  25 lbs 2 x 10   35 lbs 3 x 10  35 lbs 3 x 10 40 lbs 3 x 10    U/L leg press        SLS on foam        Tandem amb        Retro tandem        Fwd step up and over     1R x 20 1R x10  2R x10   Lat step ups  0R x 20   1R x 20  1R x 20    Lat test the water  NP   1R x 15  NP 1R x10   Sidestepping  green x 2 laps   green x 2 laps     Monster walks  green x 2 laps   green x 2 laps     Waddle walks  green x 2 laps   green x 2 laps     U/L heel raises    NV     U/L squat to chair    2 x 5 B  2 x 5 2 x 5 cushion on chair   SLS ball toss    x 3 to fatigue  x 3 to fatigue x2 to fatigue   DL balance on bosu     Mini squat + shift L/R fwd/back x10 ea                           Re-evaluation   CM      Ther Act/Gait        Pushing/pulling     x 10 laps x10 laps                            Modalities        CP prn                     HEP: COLT IQBAL [facility]

## 2022-04-28 ENCOUNTER — APPOINTMENT (OUTPATIENT)
Dept: PHYSICAL THERAPY | Facility: REHABILITATION | Age: 52
End: 2022-04-28
Payer: OTHER MISCELLANEOUS

## 2022-04-29 ENCOUNTER — OFFICE VISIT (OUTPATIENT)
Dept: PHYSICAL THERAPY | Facility: REHABILITATION | Age: 52
End: 2022-04-29
Payer: OTHER MISCELLANEOUS

## 2022-04-29 DIAGNOSIS — M25.775 OSTEOPHYTE, LEFT FOOT: ICD-10-CM

## 2022-04-29 DIAGNOSIS — S86.012D RUPTURE OF LEFT ACHILLES TENDON, SUBSEQUENT ENCOUNTER: Primary | ICD-10-CM

## 2022-04-29 PROCEDURE — 97112 NEUROMUSCULAR REEDUCATION: CPT | Performed by: PHYSICAL THERAPIST

## 2022-04-29 PROCEDURE — 97010 HOT OR COLD PACKS THERAPY: CPT | Performed by: PHYSICAL THERAPIST

## 2022-04-29 NOTE — PROGRESS NOTES
Daily Note     Today's date: 2022  Patient name: Sarwat Pittman  : 1970  MRN: [id number]  Referring provider: Maris Garnett DPM  Dx:   Encounter Diagnosis     ICD-10-CM    1  Rupture of left Achilles tendon, subsequent encounter  S86 012D    2  Osteophyte, left foot  [id number] [id number]        Start Time: 1245  Stop Time: 1337  Total time in clinic (min): 52 minutes    Subjective: Patient had a lot of knee soreness after last visit as well as some swelling which he believes is due to the higher step height for step ups      Objective: See treatment diary below      Assessment: Tolerated treatment well however deferred closed chain step exercises and single leg stance activities secondary to aggravation of knee pain  Plan to gradually resume closed chain strengthening next week as appropriate  Discussed relative rest and cryotherapy to reduce knee pain and inflammation until next visit  Patient would benefit from continued PT      Plan: Continue per plan of care        Diagnosis: s/p L Achilles repair and bone graft 21   Precautions: avoid Achilles stretching, WBAT in CAM boot   Primary impairments: decreased ankle mobility/strength, gait dysfunction, and generalized L LE strength deficits   *asterisks by exercise = given for HEP       Manuals        Posterior talocrural mobilizations        Scar mobilization                                There Ex        Rec bike        Ankle DF/PF AROM *        Ankle inv/ev AROM *        Ankle circles *        Ankle alphabet *        Seated BAPS: DF/PF, inv/ev and circles                        Patient education        Neuro Re-Ed        Retro amb on treadmill  2 mph x 6'   1 8 mph x 6'  2 mph x 6' 2 mph x 6'   3-way ankle band *        Band ankle PF *        Ankle isometrics        Supine SLR *        S/L hip abd *        Prone hip ext *        Standing 3-way SLR *        Standing heel/toe rocks        Standing heel raises        Standing toe raises Heel raises on leg press B/L  70 lbs 3 x 10   65 lbs 3 x 10  65 lbs 3 x 10 70 lbs 3 x 10   Heel raises on leg press U/L  40 lbs 3 x 10   35 lbs 3 x 10  35 lbs 3 x 10 40 lbs 3 x 10    SLS on foam        Tandem amb  x 2 laps       Retro tandem  x 2 laps       Fwd step up and over  held    1R x 20 1R x10  2R x10   Lat step ups  held   1R x 20  1R x 20    Lat test the water  held   1R x 15  NP 1R x10   Sidestepping  green x 2 laps   green x 2 laps     Monster walks  green x 2 laps   green x 2 laps     Waddle walks  green x 2 laps   green x 2 laps     U/L heel raises    NV     U/L squat to chair  held   2 x 5 B  2 x 5 2 x 5 cushion on chair   SLS ball toss  held   x 3 to fatigue  x 3 to fatigue x2 to fatigue                           Re-evaluation   CM      Ther Act/Gait        Pushing/pulling     x 10 laps x10 laps                            Modalities        CP prn    10'                  HEP: COLT IQBAL [facility]

## 2022-05-02 ENCOUNTER — APPOINTMENT (OUTPATIENT)
Dept: PHYSICAL THERAPY | Facility: REHABILITATION | Age: 52
End: 2022-05-02
Payer: OTHER MISCELLANEOUS

## 2022-05-05 ENCOUNTER — APPOINTMENT (OUTPATIENT)
Dept: PHYSICAL THERAPY | Facility: REHABILITATION | Age: 52
End: 2022-05-05
Payer: OTHER MISCELLANEOUS

## 2022-05-09 ENCOUNTER — OFFICE VISIT (OUTPATIENT)
Dept: PHYSICAL THERAPY | Facility: REHABILITATION | Age: 52
End: 2022-05-09
Payer: OTHER MISCELLANEOUS

## 2022-05-09 DIAGNOSIS — S86.012D RUPTURE OF LEFT ACHILLES TENDON, SUBSEQUENT ENCOUNTER: Primary | ICD-10-CM

## 2022-05-09 DIAGNOSIS — M25.775 OSTEOPHYTE, LEFT FOOT: ICD-10-CM

## 2022-05-09 PROCEDURE — 97530 THERAPEUTIC ACTIVITIES: CPT | Performed by: PHYSICAL THERAPIST

## 2022-05-09 PROCEDURE — 97112 NEUROMUSCULAR REEDUCATION: CPT | Performed by: PHYSICAL THERAPIST

## 2022-05-09 NOTE — PROGRESS NOTES
Daily Note     Today's date: 2022  Patient name: Bruno Perez  : 1970  MRN: [id number]  Referring provider: Viraj Angulo DPM  Dx:   Encounter Diagnosis     ICD-10-CM    1  Rupture of left Achilles tendon, subsequent encounter  S86 012D    2  Osteophyte, left foot  [id number]        Start Time: 1700  Stop Time: 1745  Total time in clinic (min): 45 minutes    Subjective: Patient did well with respect to his ankle while on vacation  He was able to do a 1 5 mile hike with no issues relative to the Achilles however he continues to have some L knee soreness      Objective: See treatment diary below      Assessment: Attempted unilateral heel raises with patient unable to complete due to lack of adequate power  Increased resistance with unilateral and bilateral heel raises on leg press with no adverse responses  Resumed pushing and pulling for work simulation  Patient exhibited good technique with therapeutic exercises and would benefit from continued PT      Plan: Continue per plan of care        Diagnosis: s/p L Achilles repair and bone graft 21   Precautions: avoid Achilles stretching, WBAT in CAM boot   Primary impairments: decreased ankle mobility/strength, gait dysfunction, and generalized L LE strength deficits   *asterisks by exercise = given for HEP       Manuals        Posterior talocrural mobilizations        Scar mobilization                                There Ex        Rec bike        Ankle DF/PF AROM *        Ankle inv/ev AROM *        Ankle circles *        Ankle alphabet *        Seated BAPS: DF/PF, inv/ev and circles                        Patient education        Neuro Re-Ed        Retro amb on treadmill  2 mph x 6'  2 mph x 6'  1 8 mph x 6'  2 mph x 6' 2 mph x 6'   3-way ankle band *        Band ankle PF *        Ankle isometrics        Supine SLR *        S/L hip abd *        Prone hip ext *        Standing 3-way SLR *        Standing heel/toe rocks        Standing heel raises   3 x 15      Standing toe raises        Heel raises on leg press B/L  70 lbs 3 x 10  75 lbs 3 x 10  65 lbs 3 x 10  65 lbs 3 x 10 70 lbs 3 x 10   Heel raises on leg press U/L  40 lbs 3 x 10  45 lbs 3 x 10  35 lbs 3 x 10  35 lbs 3 x 10 40 lbs 3 x 10    SLS on foam        Tandem amb  x 2 laps       Retro tandem  x 2 laps       Fwd step up and over  held  1R x 10   1R x 20 1R x10  2R x10   Lat step ups  held  held  1R x 20  1R x 20    Lat test the water  held  held  1R x 15  NP 1R x10   Sidestepping  green x 2 laps   green x 2 laps     Monster walks  green x 2 laps   green x 2 laps     Waddle walks  green x 2 laps   green x 2 laps     U/L heel raises   attempted  NV     U/L squat to chair  held  held   2 x 5 B  2 x 5 2 x 5 cushion on chair   SLS ball toss  held   x 3 to fatigue  x 3 to fatigue x2 to fatigue                           Re-evaluation        Ther Act/Gait        Pushing/pulling   x 10 laps   x 10 laps x10 laps                            Modalities        CP prn    10'                  HEP: COLT IQBAL [facility]

## 2022-05-12 ENCOUNTER — EVALUATION (OUTPATIENT)
Dept: PHYSICAL THERAPY | Facility: REHABILITATION | Age: 52
End: 2022-05-12
Payer: OTHER MISCELLANEOUS

## 2022-05-12 DIAGNOSIS — S86.012D RUPTURE OF LEFT ACHILLES TENDON, SUBSEQUENT ENCOUNTER: Primary | ICD-10-CM

## 2022-05-12 DIAGNOSIS — M25.775 OSTEOPHYTE, LEFT FOOT: ICD-10-CM

## 2022-05-12 PROCEDURE — 97112 NEUROMUSCULAR REEDUCATION: CPT | Performed by: PHYSICAL THERAPIST

## 2022-05-12 NOTE — PROGRESS NOTES
PT Re-Evaluation     Today's date: 2022  Patient name: Bruno Perez  : 1970  MRN: [id number]  Referring provider: Viraj Angulo DPM  Dx:   Encounter Diagnosis     ICD-10-CM    1  Rupture of left Achilles tendon, subsequent encounter  S86 012D    2  Osteophyte, left foot  [id number]        Start Time: 1700   Stop Time: 1745  Total time in clinic (min): 45 minutes    Assessment  Assessment details: Patient is a 46 y o  male presenting s/p L Achilles repair and bone graft with date of surgery 21  Patient exhibits good progress toward objective and functional goals at time of reexamination  Patient exhibits improvements with tolerance to work activities including lifting/carrying and pushing/pulling, stair navigation, and longer distance community ambulation since initiating PT however continues to have limitations compared to prior level of function  Remaining functional limitations include inability to return to unrestricted work  Patient achieved FOTO score of 72 indicating a 31 point improvement since prior assessment  As a result of impairments patient has continued limitations with daily and functional activities and would benefit from continued skilled PT interventions to address these impairments in order to maximize function  Impairments: abnormal muscle firing, abnormal or restricted ROM, abnormal movement, activity intolerance, impaired physical strength and pain with function     Prognosis: good    Goals  Impairment Goals: 4-6 weeks  - Patient to decrease pain to 2/10 - MET  - Patient to improve ankle AROM to [facility] all planes - MET  - Patient to improve L hip/knee strength to 4+/5 throughout - MET    Functional Goals: by discharge  - Patient to discharge to [facility] - PROGRESSING  - Patient to increase FOTO to 72 - PROGRESSING  - Patient to return to work without limitations - PROGRESSING  - Patient to improve L ankle strength to 4+/5 - MOSTLY MET  - Patient to lift 70 lbs   floor to waist without compensation/limitations - PROGRESSING    Plan  Patient would benefit from: skilled physical therapy  Planned modality interventions: cryotherapy, TENS and thermotherapy: hydrocollator packs  Planned therapy interventions: flexibility, home exercise program, joint mobilization, manual therapy, neuromuscular re-education, patient education, strengthening, stretching, therapeutic activities, therapeutic exercise and functional ROM exercises  Frequency: 1x week  Duration in weeks: 4  Treatment plan discussed with: patient        Subjective Evaluation    History of Present Illness  Mechanism of injury: HISTORY OF PRESENT ILLNESS: Patient denies limitations with his typical daily activities and function  He is performing his normal job for the most part however is not doing stairs as much  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  He was able to complete the job but had some apprehension  He has had minimal Achilles discomfort since last reassessment     PRIOR TREATMENT: surgery as detailed above  AGGRAVATING FACTORS: N/A  EASING FACTORS: N/A  WORK:  (description in chart)  IMAGING: x-rays and MRI preoperatively  FUNCTIONAL LIMITATIONS: light duty work, not completing stairs as often at work  IMPROVEMENTS: ankle mobility, decreased incisional sensitivity, wearing normal footwear full time, tolerance to standing/ambulation, stair navigation, tolerance to work  SUBJECTIVE FUNCTIONAL LEVEL: 95% improvement with PT  PATIENT GOAL: just to heal and get back to work    Pain  Current pain ratin  At best pain ratin  At worst pain rating: 3  Location: L Achilles          Objective     Active Range of Motion   Left Ankle/Foot   Dorsiflexion (ke): 17 degrees   Plantar flexion: WFL  Inversion: WFL  Eversion: WFL    Right Ankle/Foot   Dorsiflexion (ke): 10 degrees   Plantar flexion: WFL  Inversion: WFL  Eversion: WFL    Strength/Myotome Testing     Lumbar     Right   Normal strength    Left Hip   Planes of Motion   Flexion: 5  External rotation: 5  Internal rotation: 5    Left Knee   Flexion: 5  Extension: 5    Left Ankle/Foot   Dorsiflexion: 5  Inversion: 5  Eversion: 5    Right Ankle/Foot   Normal strength    Additional Strength Details  Single leg heel raises:  R: 17  L: 10    Ambulation     Observational Gait     Additional Observational Gait Details  No significant deviations      Flowsheet Rows    Flowsheet Row Most Recent Value   PT/OT G-Codes    Current Score 72   Projected Score 61              Diagnosis: s/p L Achilles repair and bone graft 11/23/21   Precautions: avoid Achilles stretching, WBAT in CAM boot   Primary impairments: decreased ankle mobility/strength, gait dysfunction, and generalized L LE strength deficits   *asterisks by exercise = given for HEP    4/29 5/9 5/12 4/21 4/25   Manuals        Posterior talocrural mobilizations                                There Ex        Rec bike        Ankle DF/PF AROM *        Ankle inv/ev AROM *        Ankle circles *        Ankle alphabet *        Seated BAPS: DF/PF, inv/ev and circles                        Patient education        Neuro Re-Ed        Retro amb on treadmill  2 mph x 6'  2 mph x 6'  2 mph x 6'  2 mph x 6' 2 mph x 6'   3-way ankle band *        Band ankle PF *        Ankle isometrics        Supine SLR *        S/L hip abd *        Prone hip ext *        Standing 3-way SLR *        Standing heel/toe rocks        Standing heel raises   3 x 15      Standing toe raises        Heel raises on leg press B/L  70 lbs 3 x 10  75 lbs 3 x 10  75 lbs 3 x 10  65 lbs 3 x 10 70 lbs 3 x 10   Heel raises on leg press U/L  40 lbs 3 x 10  45 lbs 3 x 10  45 lbs 3 x 10  35 lbs 3 x 10 40 lbs 3 x 10    SLS on foam        Tandem amb  x 2 laps       Retro tandem  x 2 laps       Fwd step up and over  held  1R x 10   1R x 20 1R x10  2R x10   Lat step ups  held  held   1R x 20    Lat test the water held  held   NP 1R x10   Sidestepping  green x 2 laps       Monster walks  green x 2 laps       Waddle walks  green x 2 laps       U/L heel raises   attempted      U/L squat to chair  held  held    2 x 5 2 x 5 cushion on chair   SLS ball toss  held    x 3 to fatigue x2 to fatigue                           Re-evaluation    CM     Ther Act/Gait        Pushing/pulling   x 10 laps   x 10 laps x10 laps   Functional lifting                         Modalities        CP prn    10'                  HEP: COLT IQBAL [facility]

## 2022-05-19 ENCOUNTER — OFFICE VISIT (OUTPATIENT)
Dept: PHYSICAL THERAPY | Facility: REHABILITATION | Age: 52
End: 2022-05-19
Payer: OTHER MISCELLANEOUS

## 2022-05-19 DIAGNOSIS — M25.775 OSTEOPHYTE, LEFT FOOT: ICD-10-CM

## 2022-05-19 DIAGNOSIS — S86.012D RUPTURE OF LEFT ACHILLES TENDON, SUBSEQUENT ENCOUNTER: Primary | ICD-10-CM

## 2022-05-19 PROCEDURE — 97112 NEUROMUSCULAR REEDUCATION: CPT | Performed by: PHYSICAL THERAPIST

## 2022-05-19 PROCEDURE — 97530 THERAPEUTIC ACTIVITIES: CPT | Performed by: PHYSICAL THERAPIST

## 2022-05-19 NOTE — PROGRESS NOTES
Daily Note     Today's date: 2022  Patient name: Pawel Gabriel   : 1970  MRN: [id number]  Referring provider: Zoila Cabezas DPM  Dx:   Encounter Diagnosis     ICD-10-CM    1  Rupture of left Achilles tendon, subsequent encounter  S86 012D    2  Osteophyte, left foot  [id number]        Start Time: 1700  Stop Time: 1745  Total time in clinic (min): 45 minutes    Subjective: Patient notes his ankle has been feeling good since last week  He has been doing okay at work      Objective: See treatment diary below      Assessment: Tolerated treatment well including increased resistance with pushing and pulling  Patient able to complete unilateral heel raises with muscle fatigue and slight discomfort noted  Initiated elliptical as patient would like to return to utilizing elliptical at the gym with fatigue noted immediately following  Patient exhibited good technique with therapeutic exercises and would benefit from continued PT      Plan: Continue per plan of care        Diagnosis: s/p L Achilles repair and bone graft 21   Precautions: avoid Achilles stretching, WBAT in CAM boot   Primary impairments: decreased ankle mobility/strength, gait dysfunction, and generalized L LE strength deficits   *asterisks by exercise = given for HEP       Manuals        Posterior talocrural mobilizations                                There Ex        Rec bike        Ankle DF/PF AROM *        Ankle inv/ev AROM *        Ankle circles *        Ankle alphabet *        Seated BAPS: DF/PF, inv/ev and circles                        Patient education        Neuro Re-Ed        Retro amb on treadmill  2 mph x 6'  2 mph x 6'  2 mph x 6'  2 mph x 8' 2 mph x 6'   Elliptical     L5 x 5'    3-way ankle band *        Band ankle PF *        Ankle isometrics        Supine SLR *        S/L hip abd *        Prone hip ext *        Standing 3-way SLR *        Standing heel/toe rocks        Standing heel raises   3 x 15 Standing toe raises        Heel raises on leg press B/L  70 lbs 3 x 10  75 lbs 3 x 10  75 lbs 3 x 10  85 lbs 3 x 10 70 lbs 3 x 10   Heel raises on leg press U/L  40 lbs 3 x 10  45 lbs 3 x 10  45 lbs 3 x 10  55 lbs 3 x 10 40 lbs 3 x 10    SLS on foam        Tandem amb  x 2 laps    x 2 laps foam    Retro tandem  x 2 laps    x 2 laps foam    Fwd step up and over  held  1R x 10   1R x10  2R x10   Lat step ups  held  held      Lat test the water  held  held   1R x10   Sidestepping  green x 2 laps       Monster walks  green x 2 laps       Waddle walks  green x 2 laps       U/L heel raises   attempted   2 x 10 B    U/L squat to chair  held  held    2 x 5 cushion on chair   SLS ball toss  held    x 3 to fatigue x2 to fatigue                           Re-evaluation    CM     Ther Act/Gait        Pushing/pulling   x 10 laps   x 5 laps with 98 lb   x 10 laps   Functional lifting                         Modalities        CP prn    10'                  HEP: COLT [facility] [place]

## 2022-05-26 ENCOUNTER — OFFICE VISIT (OUTPATIENT)
Dept: PHYSICAL THERAPY | Facility: REHABILITATION | Age: 52
End: 2022-05-26
Payer: OTHER MISCELLANEOUS

## 2022-05-26 DIAGNOSIS — M25.775 OSTEOPHYTE, LEFT FOOT: ICD-10-CM

## 2022-05-26 DIAGNOSIS — S86.012D RUPTURE OF LEFT ACHILLES TENDON, SUBSEQUENT ENCOUNTER: Primary | ICD-10-CM

## 2022-05-26 PROCEDURE — 97530 THERAPEUTIC ACTIVITIES: CPT | Performed by: PHYSICAL THERAPIST

## 2022-05-26 PROCEDURE — 97112 NEUROMUSCULAR REEDUCATION: CPT | Performed by: PHYSICAL THERAPIST

## 2022-05-26 NOTE — PROGRESS NOTES
Daily Note     Today's date: 2022  Patient name: Bela Fleming  : 1970  MRN: [id number]  Referring provider: Danielle Denton DPM  Dx:   Encounter Diagnosis     ICD-10-CM    1  Rupture of left Achilles tendon, subsequent encounter  S86 012D    2  Osteophyte, left foot  [id number]        Start Time: 1648  Stop Time: 1745  Total time in clinic (min): 57 minutes    Subjective: Patient brought vehicle and printer from work to treatment to address ergonomics and to demonstrate job duties      Objective: See treatment diary below      Assessment: Completed job simulation with patient with recommendations to consider lifting straps and to keep printer as close to body as possible  Fatigue noted following single leg stance with ball toss  Patient able to complete unilateral heel raises with increased difficulty compared to uninvolved side  Patient demonstrated fatigue post treatment and would benefit from continued PT      Plan: Continue per plan of care        Diagnosis: s/p L Achilles repair and bone graft 21   Precautions: avoid Achilles stretching, WBAT in CAM boot   Primary impairments: decreased ankle mobility/strength, gait dysfunction, and generalized L LE strength deficits   *asterisks by exercise = given for HEP       Manuals        Posterior talocrural mobilizations                                There Ex        Rec bike        Ankle DF/PF AROM *        Ankle inv/ev AROM *        Ankle circles *        Ankle alphabet *        Seated BAPS: DF/PF, inv/ev and circles                        Patient education        Neuro Re-Ed        Retro amb on treadmill  2 mph x 6'  2 mph x 6'  2 mph x 6'  2 mph x 8'  2 mph x 8'   Elliptical     L5 x 5'    3-way ankle band *        Band ankle PF *        Ankle isometrics        Supine SLR *        S/L hip abd *        Prone hip ext *        Standing 3-way SLR *        Standing heel/toe rocks        Standing heel raises   3 x 15      Standing toe raises        Heel raises on leg press B/L  70 lbs 3 x 10  75 lbs 3 x 10  75 lbs 3 x 10  85 lbs 3 x 10  95 lbs 3 x 10   Heel raises on leg press U/L  40 lbs 3 x 10  45 lbs 3 x 10  45 lbs 3 x 10  55 lbs 3 x 10  65 lbs 3 x 10   SLS on foam        Tandem amb  x 2 laps    x 2 laps foam  x 5 laps foam   Retro tandem  x 2 laps    x 2 laps foam  x 5 laps foam   Fwd step up and over  held  1R x 10      Lat step ups  held  held      Lat test the water  held  held      Sidestepping  green x 2 laps       Monster walks  green x 2 laps       Waddle walks  green x 2 laps       U/L heel raises   attempted   2 x 10 B  2 x 10 B   U/L squat to chair  held  held       SLS ball toss  held    x 3 to fatigue  x 3 to fatigue                           Re-evaluation    CM     Ther Act/Gait        Pushing/pulling   x 10 laps   x 5 laps with 98 lb  Functional lifting        Job simulation      15'            Modalities        CP prn    10'                  HEP: COLT IQBAL [facility]

## 2022-06-02 ENCOUNTER — OFFICE VISIT (OUTPATIENT)
Dept: PHYSICAL THERAPY | Facility: REHABILITATION | Age: 52
End: 2022-06-02
Payer: OTHER MISCELLANEOUS

## 2022-06-02 DIAGNOSIS — S86.012D RUPTURE OF LEFT ACHILLES TENDON, SUBSEQUENT ENCOUNTER: Primary | ICD-10-CM

## 2022-06-02 DIAGNOSIS — M25.775 OSTEOPHYTE, LEFT FOOT: ICD-10-CM

## 2022-06-02 PROCEDURE — 97112 NEUROMUSCULAR REEDUCATION: CPT | Performed by: PHYSICAL THERAPIST

## 2022-06-02 NOTE — PROGRESS NOTES
Daily Note     Today's date: 2022  Patient name: Renda Lanes  : 1970  MRN: [id number]  Referring provider: Terrell Marrero DPM  Dx:   Encounter Diagnosis     ICD-10-CM    1  Rupture of left Achilles tendon, subsequent encounter  S86 012D    2  Osteophyte, left foot  [id number]        Start Time: 1700  Stop Time: 1745  Total time in clinic (min): 45 minutes    Subjective: Patient notes he has felt good since last visit      Objective: See treatment diary below      Assessment: Attempted unilateral squats to chair with significant increase in knee pain noted however no Achilles discomfort  Improving strength with unilateral heel raises  Plan to reassess and discharge to independent [facility] following next visit  Plan: Continue per plan of care        Diagnosis: s/p L Achilles repair and bone graft 21   Precautions: avoid Achilles stretching, WBAT in CAM boot   Primary impairments: decreased ankle mobility/strength, gait dysfunction, and generalized L LE strength deficits   *asterisks by exercise = given for HEP       Manuals        Posterior talocrural mobilizations                                There Ex        Rec bike        Ankle DF/PF AROM *        Ankle inv/ev AROM *        Ankle circles *        Ankle alphabet *        Seated BAPS: DF/PF, inv/ev and circles                        Patient education        Neuro Re-Ed        Retro amb on treadmill  2 mph x 8'  2 mph x 6'  2 mph x 6'  2 mph x 8'  2 mph x 8'   Elliptical     L5 x 5'    3-way ankle band *        Band ankle PF *        Ankle isometrics        Supine SLR *        S/L hip abd *        Prone hip ext *        Standing 3-way SLR *        Standing heel/toe rocks        Standing heel raises   3 x 15      Standing toe raises        Heel raises on leg press B/L  125 lbs 3 x 10  75 lbs 3 x 10  75 lbs 3 x 10  85 lbs 3 x 10  95 lbs 3 x 10   Heel raises on leg press U/L  85 lbs 3 x 10  45 lbs 3 x 10  45 lbs 3 x 10  55 lbs 3 x 10  65 lbs 3 x 10   SLS on foam        Tandem amb  x 5 laps foam    x 2 laps foam  x 5 laps foam   Retro tandem  x 5 laps foam    x 2 laps foam  x 5 laps foam   Sidestepping  x 5 laps foam       Fwd step up and over   1R x 10      Lat step ups   held      Lat test the water   held      Boeing walks        Waddle walks        U/L heel raises  2 x 10  attempted   2 x 10 B  2 x 10 B   U/L squat to chair  attempted  held       SLS ball toss  x 3 to fatigue    x 3 to fatigue  x 3 to fatigue                           Re-evaluation    CM     Ther Act/Gait        Pushing/pulling   x 10 laps   x 5 laps with 98 lb  Functional lifting        Job simulation      15'            Modalities        CP prn                     HEP: COLT [facility]

## 2022-06-08 ENCOUNTER — OFFICE VISIT (OUTPATIENT)
Dept: PODIATRY | Facility: CLINIC | Age: 52
End: 2022-06-08
Payer: OTHER MISCELLANEOUS

## 2022-06-08 VITALS — BODY MASS INDEX: 37.03 KG/M2 | HEIGHT: 69 IN | WEIGHT: 250 LBS

## 2022-06-08 DIAGNOSIS — S86.012A RUPTURE OF LEFT ACHILLES TENDON, INITIAL ENCOUNTER: Primary | ICD-10-CM

## 2022-06-08 DIAGNOSIS — M25.775 OSTEOPHYTE, LEFT FOOT: ICD-10-CM

## 2022-06-08 PROCEDURE — 99213 OFFICE O/P EST LOW 20 MIN: CPT | Performed by: PODIATRIST

## 2022-06-08 NOTE — PROGRESS NOTES
PATIENT:  Ella Harrell      1970      ASSESSMENT     1  Rupture of left Achilles tendon, initial encounter     2  Osteophyte, left foot          PLAN  Reviewed the note from PT  He is doing well  Advance activity as tolerated  Ankle support as needed  Okay to return to full duty with no restriction  D/C from our care  HISTORY OF PRESENT ILLNESS  Patient presents for follow-up  He is doing well with minimal pain  Tolerates current work duty well  No new complaint        PAST MEDICAL HISTORY:  Past Medical History:   Diagnosis Date    Borderline diabetes     Fractures     Heart murmur of      History of chest pain     , "had full cardio workup LVH/all good thought related to work stress"    History of Graves' disease 2021    "had radioactive iodine"    Hyperlipidemia     Hypertension     Left ankle pain     Moderate exercise     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    Obesity (BMI 35 0-39 9 without comorbidity)     Teeth missing     Wears glasses        PAST SURGICAL HISTORY:  Past Surgical History:   Procedure Laterality Date    APPENDECTOMY      BONE EXOSTOSIS EXCISION Left 2021    Procedure: EXCISION EXOSTOSIS HEEL;  Surgeon: Katina Cox DPM;  Location: [facility];  Service: Podiatry    CARDIAC CATHETERIZATION      approx  or so, pt reports "no blockages"    COLONOSCOPY      HAND SURGERY      beebee pellet from hand removed        ALLERGIES:  Bee venom    MEDICATIONS:  Current Outpatient Medications   Medication Sig Dispense Refill    acetaminophen (TYLENOL) 500 mg tablet Take 500 mg by mouth every 6 (six) hours as needed for mild pain      amLODIPine (NORVASC) 5 mg tablet Take 5 mg by mouth      aspirin (ECOTRIN LOW STRENGTH) 81 mg EC tablet Take by mouth      atorvastatin (LIPITOR) 40 mg tablet       Diclofenac Sodium (VOLTAREN) 1 % Apply 2 g topically 4 (four) times a day 150 g 2    EPINEPHrine (EPIPEN) 0 3 mg/0 3 mL SOAJ Inject 0 3 mg into a muscle      fluticasone (FLONASE) 50 mcg/act nasal spray 1-2 sprays into each nostril as needed        hydrochlorothiazide (HYDRODIURIL) 25 mg tablet Take 25 mg by mouth daily      levalbuterol (XOPENEX HFA) 45 mcg/act inhaler Inhale as needed        levothyroxine 137 mcg tablet       loratadine (CLARITIN) 10 mg tablet Take 10 mg by mouth daily      metFORMIN (GLUCOPHAGE-XR) 500 mg 24 hr tablet Take 500 mg by mouth daily with breakfast        naproxen (NAPROSYN) 500 mg tablet as needed        omeprazole (PriLOSEC) 20 mg delayed release capsule       oxyCODONE-acetaminophen (Percocet) 5-325 mg per tablet Take 1 tablet by mouth every 6 (six) hours as needed for severe pain for up to 20 doses Max Daily Amount: 4 tablets 20 tablet 0    propranolol (INDERAL) 20 mg tablet TAKE 1 TABLET BY MOUTH TWICE A DAY      terbinafine (LamISIL) 250 mg tablet Take 250 mg by mouth daily        apixaban (Eliquis) 2 5 mg Take 1 tablet (2 5 mg total) by mouth 2 (two) times a day 60 tablet 0    chlorhexidine (PERIDEX) 0 12 % solution  (Patient not taking: No sig reported)      [employer] Aspirin EC 81 MG EC tablet  (Patient not taking: No sig reported)      ibuprofen (MOTRIN) 600 mg tablet  (Patient not taking: No sig reported)       No current facility-administered medications for this visit         SOCIAL HISTORY:  Social History     Socioeconomic History    Marital status: Single     Spouse name: None    Number of children: None    Years of education: None    Highest education level: None   Occupational History    None   Tobacco Use    Smoking status: Never Smoker    Smokeless tobacco: Never Used   Vaping Use    Vaping Use: Never used   Substance and Sexual Activity    Alcohol use: Not Currently    Drug use: Not Currently    Sexual activity: None     Comment: defer   Other Topics Concern    None   Social History Narrative    None     Social Determinants of Health     Financial Resource Strain: Not on file   Food Insecurity: Not on file   Transportation Needs: Not on file   Physical Activity: Not on file   Stress: Not on file   Social Connections: Not on file   Intimate Partner Violence: Not on file   Housing Stability: Not on file        REVIEW OF SYSTEMS  GENERAL: No fever or chills  HEART: No chest pain, or palpitation  RESPIRATORY:  No SOB or cough  GI: No Nausea, vomit or diarrhea  NEUROLOGIC: No syncope or acute weakness  MUSCULOSKELETAL: No calf pain or edema  PHYSICAL EXAMINATION  GENERAL  The patient appears in NAD / non-toxic  Afebrile  VSS    VASCULAR EXAM  Pedal pulses and vascular status are intact  No calf pain or edema bilaterally  No cyanosis  DERMATOLOGIC EXAM  No wound  No signs of infection  No drainage  No necrosis or dehiscence  Minimal edema LLE  NEUROLOGIC EXAM  AAO X 3  No focal neurologic deficit  Neurologic status is intact BLE  MUSCULOSKELETAL EXAM  Achilles tendon intact left  Improved plantarflexion left foot  Negative Mcneil sign  Normal left ankle ROM  No fluctuation or crepitus

## 2022-06-09 ENCOUNTER — EVALUATION (OUTPATIENT)
Dept: PHYSICAL THERAPY | Facility: REHABILITATION | Age: 52
End: 2022-06-09
Payer: OTHER MISCELLANEOUS

## 2022-06-09 DIAGNOSIS — S86.012D RUPTURE OF LEFT ACHILLES TENDON, SUBSEQUENT ENCOUNTER: Primary | ICD-10-CM

## 2022-06-09 DIAGNOSIS — M25.775 OSTEOPHYTE, LEFT FOOT: ICD-10-CM

## 2022-06-09 PROCEDURE — 97112 NEUROMUSCULAR REEDUCATION: CPT | Performed by: PHYSICAL THERAPIST

## 2022-06-09 PROCEDURE — 97110 THERAPEUTIC EXERCISES: CPT | Performed by: PHYSICAL THERAPIST

## 2022-06-09 NOTE — PROGRESS NOTES
PT Discharge    Today's date: 2022  Patient name: Sury Mccall  : 1970  MRN: [id number]  Referring provider: Carmela Montelongo DPM  Dx:   Encounter Diagnosis     ICD-10-CM    1  Rupture of left Achilles tendon, subsequent encounter  S86 [id number]    2  Osteophyte, left foot  [id number]        Start Time: 1700   Stop Time: 1745  Total time in clinic (min): 45 minutes    Assessment  Assessment details: Patient has been compliant with physical therapy and has achieved functional goals at this time  Patient exhibits functional improvements including ambulation tolerance, standing tolerance, unrestricted return to work, and stair navigation  Patient achieved FOTO score of 77 indicating a 36 point improvement since initiating interventions  Patient is discharged to independent [facility]  Reviewed HEP, educated patient regarding discharge plan, and answered all patient questions to satisfaction  Good prognosis  Prognosis: good    Goals  Impairment Goals: 4-6 weeks  - Patient to decrease pain to 2/10 - MET  - Patient to improve ankle AROM to [facility] all planes - MET  - Patient to improve L hip/knee strength to 4+/5 throughout - MET    Functional Goals: by discharge  - Patient to discharge to independent [facility] - MET  - Patient to increase FOTO to 72 - MET  - Patient to return to work without limitations - MET  - Patient to improve L ankle strength to 4+/5 - MET  - Patient to lift 70 lbs  floor to waist without compensation/limitations - MET    Plan  Plan details: Discharge to independent [facility]  Treatment plan discussed with: patient        Subjective Evaluation    History of Present Illness  Mechanism of injury: HISTORY OF PRESENT ILLNESS: Patient has been progressing very well with interventions  He has been released from surgeon's care  He is able to return to full duty work  No limitations with daily activities or work    PRIOR TREATMENT: surgery as detailed above  AGGRAVATING FACTORS: N/A  EASING FACTORS: N/A  WORK: truck  (description in chart)  IMAGING: x-rays and MRI preoperatively  FUNCTIONAL LIMITATIONS: none  IMPROVEMENTS: ankle mobility, decreased incisional sensitivity, wearing normal footwear full time, tolerance to standing/ambulation, stair navigation, tolerance to work, full duty work  SUBJECTIVE FUNCTIONAL LEVEL: 95% improvement with PT  PATIENT GOAL: just to heal and get back to work    Pain  Current pain ratin  At best pain ratin  At worst pain ratin  Location: L Achilles          Objective     Active Range of Motion   Left Ankle/Foot   Dorsiflexion (ke): 17 degrees   Plantar flexion: WFL  Inversion: WFL  Eversion: WFL    Right Ankle/Foot   Dorsiflexion (ke): 10 degrees   Plantar flexion: WFL  Inversion: WFL  Eversion: WFL    Strength/Myotome Testing     Lumbar     Right   Normal strength    Left Hip   Planes of Motion   Flexion: 5  External rotation: 5  Internal rotation: 5    Left Knee   Flexion: 5  Extension: 5    Left Ankle/Foot   Dorsiflexion: 5  Plantar flexion: 5  Inversion: 5  Eversion: 5    Right Ankle/Foot   Normal strength    Additional Strength Details  Single leg heel raises:  R: 20  L: 20    Ambulation     Observational Gait     Additional Observational Gait Details  No deviations      Flowsheet Rows    Flowsheet Row Most Recent Value   PT/OT G-Codes    Current Score 77   Projected Score 61              Diagnosis: s/p L Achilles repair and bone graft 21   Precautions: avoid Achilles stretching, WBAT in CAM boot   Primary impairments: decreased ankle mobility/strength, gait dysfunction, and generalized L LE strength deficits   *asterisks by exercise = given for HEP       Manuals        Posterior talocrural mobilizations                                There Ex        Rec bike        Ankle DF/PF AROM *        Ankle inv/ev AROM *        Ankle circles *        Ankle alphabet *        Seated BAPS: DF/PF, inv/ev and circles                        Patient education Neuro Re-Ed        Retro amb on treadmill  2 mph x 8'  fwd x 8'  2 mph x 6'  2 mph x 8'  2 mph x 8'   Elliptical     L5 x 5'    3-way ankle band *        Band ankle PF *        Ankle isometrics        Supine SLR *        S/L hip abd *        Prone hip ext *        Standing 3-way SLR *        Standing heel/toe rocks        Standing heel raises *        Standing toe raises        Heel raises on leg press B/L  125 lbs 3 x 10   75 lbs 3 x 10  85 lbs 3 x 10  95 lbs 3 x 10   Heel raises on leg press U/L  85 lbs 3 x 10   45 lbs 3 x 10  55 lbs 3 x 10  65 lbs 3 x 10   SLS on foam        Tandem amb  x 5 laps foam    x 2 laps foam  x 5 laps foam   Retro tandem  x 5 laps foam    x 2 laps foam  x 5 laps foam   Sidestepping  x 5 laps foam       Fwd step up and over        Lat step ups        Lat test the water        Boeing walks        Waddle walks        U/L heel raises *  2 x 10    2 x 10 B  2 x 10 B   U/L squat to chair  attempted       SLS ball toss  x 3 to fatigue    x 3 to fatigue  x 3 to fatigue                           Re-evaluation   CM  CM     Ther Act/Gait        Pushing/pulling     x 5 laps with 98 lb  Functional lifting        Job simulation      15'            Modalities        CP prn                     HEP: [facility]

## 2022-06-10 ENCOUNTER — TELEPHONE (OUTPATIENT)
Dept: PODIATRY | Facility: CLINIC | Age: 52
End: 2022-06-10

## 2022-06-10 DIAGNOSIS — S86.012A RUPTURE OF LEFT ACHILLES TENDON, INITIAL ENCOUNTER: Primary | ICD-10-CM

## 2022-06-10 DIAGNOSIS — Z98.890 POST-OPERATIVE STATE: ICD-10-CM

## 2022-06-10 DIAGNOSIS — M25.775 OSTEOPHYTE, LEFT FOOT: ICD-10-CM

## 2022-06-10 NOTE — TELEPHONE ENCOUNTER
Pt  Called  He needs prescription to continue physical therapy  Can we please write him one? Please advise  Thank you

## 2022-06-10 NOTE — TELEPHONE ENCOUNTER
Called pt and left a message letting him know that the order is in, but I will also mail it out to him incase he needs it

## 2023-01-19 DIAGNOSIS — M25.775 OSTEOPHYTE, LEFT FOOT: ICD-10-CM

## 2023-01-19 DIAGNOSIS — S86.012A RUPTURE OF LEFT ACHILLES TENDON, INITIAL ENCOUNTER: ICD-10-CM

## (undated) DEVICE — SUT STRATAFIX SPIRAL MONOCRYL PLUS 4-0 PS-2 30CM SXMP1B117

## (undated) DEVICE — BETHLEHEM UNIVERSAL  MIONR EXT: Brand: CARDINAL HEALTH

## (undated) DEVICE — NEEDLE 18 G X 1 1/2

## (undated) DEVICE — NEEDLE 25G X 1 1/2

## (undated) DEVICE — CRADLE EXTREMITY UNIVERSAL CONTOURED

## (undated) DEVICE — POSITION CUSHION INSERT LARGE PRONEVIEW

## (undated) DEVICE — UNDYED BRAIDED (POLYGLACTIN 910), SYNTHETIC ABSORBABLE SUTURE: Brand: COATED VICRYL

## (undated) DEVICE — PLUMEPEN PRO 10FT

## (undated) DEVICE — TONGUE DEPRESSOR STERILE

## (undated) DEVICE — SYRINGE 10ML LL

## (undated) DEVICE — CUFF TOURNIQUET 30 X 4 IN QUICK CONNECT DISP 1BLA

## (undated) DEVICE — STRETCH BANDAGE: Brand: CURITY

## (undated) DEVICE — CAST PADDING 4 IN SYNTHETIC NON-STRL

## (undated) DEVICE — COBAN 4 IN STERILE

## (undated) DEVICE — SCD SEQUENTIAL COMPRESSION COMFORT SLEEVE MEDIUM KNEE LENGTH: Brand: KENDALL SCD

## (undated) DEVICE — CURITY NON-ADHERENT STRIPS: Brand: CURITY

## (undated) DEVICE — INTENDED FOR TISSUE SEPARATION, AND OTHER PROCEDURES THAT REQUIRE A SHARP SURGICAL BLADE TO PUNCTURE OR CUT.: Brand: BARD-PARKER ® CARBON RIB-BACK BLADES

## (undated) DEVICE — GLOVE SRG BIOGEL 7.5

## (undated) DEVICE — KERLIX BANDAGE ROLL: Brand: KERLIX

## (undated) DEVICE — SUT ETHILON 3-0 PS-1 18 IN 1663G

## (undated) DEVICE — ACE WRAP 6 IN UNSTERILE

## (undated) DEVICE — 2000CC GUARDIAN II: Brand: GUARDIAN

## (undated) DEVICE — DRAPE C-ARM X-RAY

## (undated) DEVICE — WEBRIL 6 IN UNSTERILE

## (undated) DEVICE — GLOVE INDICATOR PI UNDERGLOVE SZ 7.5 BLUE

## (undated) DEVICE — Device

## (undated) DEVICE — SPLINT ORTHO-GLASS 4IN X 15FT

## (undated) DEVICE — BLADE SAGITTAL 25.6 X 9.5MM

## (undated) DEVICE — JAMSHIDI BONE MARROW BIOPSY/ASPIRATION NEEDLE, WITH LUER-LOCK ADAPTER 13GX3.5 ASP: Brand: JAMSHIDI

## (undated) DEVICE — SUT ETHILON 4-0 PS-2 18 IN 1667H

## (undated) DEVICE — 10FR FRAZIER SUCTION HANDLE: Brand: CARDINAL HEALTH

## (undated) DEVICE — VIOLET BRAIDED (POLYGLACTIN 910), SYNTHETIC ABSORBABLE SUTURE: Brand: COATED VICRYL

## (undated) DEVICE — SUT VICRYL 4-0 PS-2 27 IN J426H

## (undated) DEVICE — DRAPE C-ARMOUR

## (undated) DEVICE — STOCKINETTE REGULAR

## (undated) DEVICE — CHLORAPREP HI-LITE 26ML ORANGE

## (undated) DEVICE — ACE WRAP 4 IN UNSTERILE

## (undated) DEVICE — SUT VICRYL 3-0 SH 27 IN J416H

## (undated) DEVICE — SUT VICRYL 3-0 PS-2 27 IN J427H

## (undated) DEVICE — PREP PAD BNS: Brand: CONVERTORS